# Patient Record
Sex: FEMALE | Race: WHITE | Employment: FULL TIME | URBAN - METROPOLITAN AREA
[De-identification: names, ages, dates, MRNs, and addresses within clinical notes are randomized per-mention and may not be internally consistent; named-entity substitution may affect disease eponyms.]

---

## 2017-08-09 ENCOUNTER — OP HISTORICAL/CONVERTED ENCOUNTER (OUTPATIENT)
Dept: OTHER | Age: 32
End: 2017-08-09

## 2018-02-15 ENCOUNTER — ED HISTORICAL/CONVERTED ENCOUNTER (OUTPATIENT)
Dept: OTHER | Age: 33
End: 2018-02-15

## 2018-02-21 ENCOUNTER — ED HISTORICAL/CONVERTED ENCOUNTER (OUTPATIENT)
Dept: OTHER | Age: 33
End: 2018-02-21

## 2018-02-28 ENCOUNTER — OP HISTORICAL/CONVERTED ENCOUNTER (OUTPATIENT)
Dept: OTHER | Age: 33
End: 2018-02-28

## 2019-03-04 ENCOUNTER — OFFICE VISIT (OUTPATIENT)
Dept: SURGERY | Age: 34
End: 2019-03-04

## 2019-03-04 DIAGNOSIS — Z98.84 S/P GASTRIC BYPASS: ICD-10-CM

## 2019-03-04 DIAGNOSIS — R10.9 ABDOMINAL PAIN, UNSPECIFIED ABDOMINAL LOCATION: Primary | ICD-10-CM

## 2019-03-04 DIAGNOSIS — R11.2 NAUSEA AND VOMITING, INTRACTABILITY OF VOMITING NOT SPECIFIED, UNSPECIFIED VOMITING TYPE: ICD-10-CM

## 2019-03-04 RX ORDER — OMEPRAZOLE 20 MG/1
20 CAPSULE, DELAYED RELEASE ORAL DAILY
COMMUNITY
End: 2019-03-08 | Stop reason: ALTCHOICE

## 2019-03-04 RX ORDER — ARIPIPRAZOLE 10 MG/1
10 TABLET ORAL DAILY
COMMUNITY

## 2019-03-04 RX ORDER — SERTRALINE HYDROCHLORIDE 100 MG/1
TABLET, FILM COATED ORAL DAILY
COMMUNITY

## 2019-03-04 RX ORDER — FLUTICASONE PROPIONATE AND SALMETEROL 250; 50 UG/1; UG/1
1 POWDER RESPIRATORY (INHALATION) EVERY 12 HOURS
COMMUNITY

## 2019-03-04 RX ORDER — HYDROXYZINE PAMOATE 25 MG/1
25 CAPSULE ORAL
COMMUNITY

## 2019-03-04 RX ORDER — BUSPIRONE HYDROCHLORIDE 10 MG/1
10 TABLET ORAL 3 TIMES DAILY
COMMUNITY

## 2019-03-04 RX ORDER — TRAZODONE HYDROCHLORIDE 100 MG/1
100 TABLET ORAL
COMMUNITY

## 2019-03-04 NOTE — PROGRESS NOTES
HISTORY OF PRESENT ILLNESS  Niki Clarke is a 34 y.o. female who is referred by Dr. Emiliano Bowman for further evaluation of abdominal pain. Ms. Ivett Fowler is s/p laparoscopic gastric bypass in 2017. She has lost approximately 154 pounds thus far. Her post operative course was complicated by what sounds like a perforated ulcer at the gastrojejunostomy which was repaired laparoscopically. (By Report. Records not available at this time.) She is still experiencing epigastric abdominal pain and is taking Prilosec. Denies NSAID use. Furthermore, Ms. Ivett Fowler is experiencing pain at her umbilicus and is concerned that she has a recurrent umbilical hernia. She is s/p umbilical hernia repair in the past which was apparently complicated by a post operative wound infection. (By report. Records not available.) Unsure if mesh was placed. Ms. Ivett Fowler also tells me that she has enlarged axillary and inguinal lymph nodes and is scheduled for CT Scan of the chest/abdomen/pelvis this week. She has otherwise been in her usual state of health. Past Medical History:  No date: Asthma  No date: Constipation  No date: Depression  No date: Gastric ulcer  No date: Hair loss  No date: Nausea & vomiting  No date: Poor appetite  3/4/2019: S/P gastric bypass  No date: Sleep trouble  No date: Stomach pain  No date: Tiredness  No date: Weakness    Past Surgical History:  No date: HX APPENDECTOMY  No date: HX  SECTION  No date: HX CHOLECYSTECTOMY  No date: HX COLONOSCOPY  No date: HX GASTRIC BYPASS  No date: HX HERNIA REPAIR  No date: HX HYSTERECTOMY  No date: HX TUBAL LIGATION    History reviewed. No pertinent family history. Social History: Employment - [employer]. Tobacco - Denies. EtOH - Denies. Review of systems negative except as noted. Review of Systems   Gastrointestinal: Positive for abdominal pain, constipation, nausea and vomiting. Neurological: Positive for weakness. Psychiatric/Behavioral: Positive for depression. The patient is nervous/anxious and has insomnia. Physical Exam   Constitutional: She appears well-developed and well-nourished. No distress. HENT:   Head: Normocephalic and atraumatic. Eyes: No scleral icterus. Neck: Neck supple. Cardiovascular: Normal rate and regular rhythm. Pulmonary/Chest: Effort normal and breath sounds normal.   Abdominal: Soft. She exhibits no distension. There is tenderness (Epigastric.). There is no rebound and no guarding. A hernia is present. Hernia confirmed positive in the ventral area (Possible small hernia at umbilicus. ). Musculoskeletal: Normal range of motion. Lymphadenopathy:     She has no cervical adenopathy. Neurological: She is alert. Skin:   Multiple well healed abdominal wall scars. Vitals reviewed. ASSESSMENT and PLAN  In view of the findings on H and P, concerned that Ms. Roque Sparrow has a recurrent anastomotic ulcer. Will ask GI to see for EGD. Continue Prilosec for now. Post gastric bypass diet as tolerated. Asked Ms. Barragan to bring CT Scans for review. Will try to obtain records from gastric bypass surgery, ulcer surgery and hernia repair. Will see Ms. Roque Sparrow following EGD to review findings with her. She is agreeable to this plan and is most certainly free to contact the office should any questions or concerns arise.      CC: Dr. Marlena Winston

## 2019-03-05 ENCOUNTER — DOCUMENTATION ONLY (OUTPATIENT)
Dept: SURGERY | Age: 34
End: 2019-03-05

## 2019-03-05 NOTE — PROGRESS NOTES
Reached out to [facility] in [place] to request medical records they do not take request via fax you have to mail signed release.   Released signed by patient scanned and mailed to   [facility]/ [facility]  ATTN: Release of Information  [id number] [address] [facility], [address] [place] [address], [address] [place] [address]

## 2019-03-06 VITALS
HEIGHT: 65 IN | BODY MASS INDEX: 22.82 KG/M2 | HEART RATE: 86 BPM | OXYGEN SATURATION: 99 % | SYSTOLIC BLOOD PRESSURE: 136 MMHG | RESPIRATION RATE: 16 BRPM | DIASTOLIC BLOOD PRESSURE: 69 MMHG | TEMPERATURE: 98.2 F | WEIGHT: 137 LBS

## 2019-03-07 ENCOUNTER — APPOINTMENT (OUTPATIENT)
Dept: CT IMAGING | Age: 34
End: 2019-03-07
Attending: EMERGENCY MEDICINE
Payer: OTHER GOVERNMENT

## 2019-03-07 ENCOUNTER — OFFICE VISIT (OUTPATIENT)
Dept: SURGERY | Age: 34
End: 2019-03-07

## 2019-03-07 ENCOUNTER — HOSPITAL ENCOUNTER (EMERGENCY)
Age: 34
Discharge: HOME | End: 2019-03-07
Attending: EMERGENCY MEDICINE
Payer: OTHER GOVERNMENT

## 2019-03-07 VITALS
RESPIRATION RATE: 18 BRPM | HEART RATE: 98 BPM | BODY MASS INDEX: 22.42 KG/M2 | WEIGHT: 134.6 LBS | DIASTOLIC BLOOD PRESSURE: 83 MMHG | HEIGHT: 65 IN | OXYGEN SATURATION: 100 % | SYSTOLIC BLOOD PRESSURE: 132 MMHG | TEMPERATURE: 98.1 F

## 2019-03-07 VITALS
OXYGEN SATURATION: 100 % | HEART RATE: 78 BPM | DIASTOLIC BLOOD PRESSURE: 73 MMHG | TEMPERATURE: 98.7 F | RESPIRATION RATE: 20 BRPM | SYSTOLIC BLOOD PRESSURE: 129 MMHG | WEIGHT: 138 LBS | HEIGHT: 64 IN | BODY MASS INDEX: 23.56 KG/M2

## 2019-03-07 DIAGNOSIS — K28.9 MARGINAL ULCER: Primary | ICD-10-CM

## 2019-03-07 DIAGNOSIS — R11.2 NON-INTRACTABLE VOMITING WITH NAUSEA, UNSPECIFIED VOMITING TYPE: ICD-10-CM

## 2019-03-07 DIAGNOSIS — Z87.11 H/O GASTRIC ULCER: ICD-10-CM

## 2019-03-07 DIAGNOSIS — R10.13 ABDOMINAL PAIN, EPIGASTRIC: Primary | ICD-10-CM

## 2019-03-07 DIAGNOSIS — Z98.84 H/O GASTRIC BYPASS: ICD-10-CM

## 2019-03-07 DIAGNOSIS — Z98.84 S/P GASTRIC BYPASS: ICD-10-CM

## 2019-03-07 DIAGNOSIS — R10.13 EPIGASTRIC ABDOMINAL PAIN: ICD-10-CM

## 2019-03-07 LAB
ALBUMIN SERPL-MCNC: 4.4 G/DL (ref 3.5–5)
ALBUMIN/GLOB SERPL: 1.3 {RATIO} (ref 1.1–2.2)
ALP SERPL-CCNC: 76 U/L (ref 45–117)
ALT SERPL-CCNC: 23 U/L (ref 12–78)
ANION GAP SERPL CALC-SCNC: 7 MMOL/L (ref 5–15)
APPEARANCE UR: ABNORMAL
AST SERPL-CCNC: 16 U/L (ref 15–37)
BACTERIA URNS QL MICRO: ABNORMAL /HPF
BASOPHILS # BLD: 0 K/UL (ref 0–0.1)
BASOPHILS NFR BLD: 0 % (ref 0–1)
BILIRUB SERPL-MCNC: 0.6 MG/DL (ref 0.2–1)
BILIRUB UR QL: NEGATIVE
BUN SERPL-MCNC: 13 MG/DL (ref 6–20)
BUN/CREAT SERPL: 18 (ref 12–20)
CALCIUM SERPL-MCNC: 9 MG/DL (ref 8.5–10.1)
CHLORIDE SERPL-SCNC: 107 MMOL/L (ref 97–108)
CO2 SERPL-SCNC: 27 MMOL/L (ref 21–32)
COLOR UR: ABNORMAL
COMMENT, HOLDF: NORMAL
CREAT SERPL-MCNC: 0.73 MG/DL (ref 0.55–1.02)
DIFFERENTIAL METHOD BLD: NORMAL
EOSINOPHIL # BLD: 0 K/UL (ref 0–0.4)
EOSINOPHIL NFR BLD: 1 % (ref 0–7)
EPITH CASTS URNS QL MICRO: ABNORMAL /LPF
ERYTHROCYTE [DISTWIDTH] IN BLOOD BY AUTOMATED COUNT: 13.1 % (ref 11.5–14.5)
GLOBULIN SER CALC-MCNC: 3.3 G/DL (ref 2–4)
GLUCOSE SERPL-MCNC: 128 MG/DL (ref 65–100)
GLUCOSE UR STRIP.AUTO-MCNC: NEGATIVE MG/DL
HCG UR QL: NEGATIVE
HCT VFR BLD AUTO: 40.3 % (ref 35–47)
HGB BLD-MCNC: 13 G/DL (ref 11.5–16)
HGB UR QL STRIP: NEGATIVE
IMM GRANULOCYTES # BLD AUTO: 0 K/UL (ref 0–0.04)
IMM GRANULOCYTES NFR BLD AUTO: 0 % (ref 0–0.5)
KETONES UR QL STRIP.AUTO: NEGATIVE MG/DL
LACTATE BLD-SCNC: 0.49 MMOL/L (ref 0.4–2)
LEUKOCYTE ESTERASE UR QL STRIP.AUTO: NEGATIVE
LIPASE SERPL-CCNC: 85 U/L (ref 73–393)
LYMPHOCYTES # BLD: 1.4 K/UL (ref 0.8–3.5)
LYMPHOCYTES NFR BLD: 26 % (ref 12–49)
MCH RBC QN AUTO: 31.4 PG (ref 26–34)
MCHC RBC AUTO-ENTMCNC: 32.3 G/DL (ref 30–36.5)
MCV RBC AUTO: 97.3 FL (ref 80–99)
MONOCYTES # BLD: 0.4 K/UL (ref 0–1)
MONOCYTES NFR BLD: 7 % (ref 5–13)
NEUTS SEG # BLD: 3.6 K/UL (ref 1.8–8)
NEUTS SEG NFR BLD: 66 % (ref 32–75)
NITRITE UR QL STRIP.AUTO: NEGATIVE
NRBC # BLD: 0 K/UL (ref 0–0.01)
NRBC BLD-RTO: 0 PER 100 WBC
PH UR STRIP: 7 [PH] (ref 5–8)
PLATELET # BLD AUTO: 270 K/UL (ref 150–400)
PMV BLD AUTO: 11.2 FL (ref 8.9–12.9)
POTASSIUM SERPL-SCNC: 3.6 MMOL/L (ref 3.5–5.1)
PROT SERPL-MCNC: 7.7 G/DL (ref 6.4–8.2)
PROT UR STRIP-MCNC: NEGATIVE MG/DL
RBC # BLD AUTO: 4.14 M/UL (ref 3.8–5.2)
RBC #/AREA URNS HPF: ABNORMAL /HPF (ref 0–5)
SAMPLES BEING HELD,HOLD: NORMAL
SODIUM SERPL-SCNC: 141 MMOL/L (ref 136–145)
SP GR UR REFRACTOMETRY: 1.03 (ref 1–1.03)
TROPONIN I SERPL-MCNC: <0.05 NG/ML
UROBILINOGEN UR QL STRIP.AUTO: 1 EU/DL (ref 0.2–1)
WBC # BLD AUTO: 5.5 K/UL (ref 3.6–11)
WBC URNS QL MICRO: ABNORMAL /HPF (ref 0–4)

## 2019-03-07 PROCEDURE — 85025 COMPLETE CBC W/AUTO DIFF WBC: CPT

## 2019-03-07 PROCEDURE — 99284 EMERGENCY DEPT VISIT MOD MDM: CPT

## 2019-03-07 PROCEDURE — 96374 THER/PROPH/DIAG INJ IV PUSH: CPT

## 2019-03-07 PROCEDURE — 84484 ASSAY OF TROPONIN QUANT: CPT

## 2019-03-07 PROCEDURE — 96361 HYDRATE IV INFUSION ADD-ON: CPT

## 2019-03-07 PROCEDURE — 74011250636 HC RX REV CODE- 250/636: Performed by: EMERGENCY MEDICINE

## 2019-03-07 PROCEDURE — 74011000258 HC RX REV CODE- 258: Performed by: RADIOLOGY

## 2019-03-07 PROCEDURE — 83690 ASSAY OF LIPASE: CPT

## 2019-03-07 PROCEDURE — 74177 CT ABD & PELVIS W/CONTRAST: CPT

## 2019-03-07 PROCEDURE — 80053 COMPREHEN METABOLIC PANEL: CPT

## 2019-03-07 PROCEDURE — 81025 URINE PREGNANCY TEST: CPT

## 2019-03-07 PROCEDURE — 36415 COLL VENOUS BLD VENIPUNCTURE: CPT

## 2019-03-07 PROCEDURE — 96375 TX/PRO/DX INJ NEW DRUG ADDON: CPT

## 2019-03-07 PROCEDURE — 81001 URINALYSIS AUTO W/SCOPE: CPT

## 2019-03-07 PROCEDURE — 74011636320 HC RX REV CODE- 636/320: Performed by: RADIOLOGY

## 2019-03-07 PROCEDURE — 83605 ASSAY OF LACTIC ACID: CPT

## 2019-03-07 RX ORDER — SODIUM CHLORIDE 0.9 % (FLUSH) 0.9 %
10 SYRINGE (ML) INJECTION
Status: COMPLETED | OUTPATIENT
Start: 2019-03-07 | End: 2019-03-07

## 2019-03-07 RX ORDER — KETOROLAC TROMETHAMINE 30 MG/ML
15 INJECTION, SOLUTION INTRAMUSCULAR; INTRAVENOUS
Status: COMPLETED | OUTPATIENT
Start: 2019-03-07 | End: 2019-03-07

## 2019-03-07 RX ORDER — ONDANSETRON 4 MG/1
4 TABLET, ORALLY DISINTEGRATING ORAL
Qty: 10 TAB | Refills: 0 | Status: SHIPPED | OUTPATIENT
Start: 2019-03-07

## 2019-03-07 RX ORDER — SUCRALFATE 1 G/10ML
1 SUSPENSION ORAL 4 TIMES DAILY
Qty: 414 ML | Refills: 0 | Status: SHIPPED | OUTPATIENT
Start: 2019-03-07

## 2019-03-07 RX ORDER — PANTOPRAZOLE SODIUM 40 MG/1
40 TABLET, DELAYED RELEASE ORAL 2 TIMES DAILY
Qty: 60 TAB | Refills: 0 | Status: SHIPPED | OUTPATIENT
Start: 2019-03-07

## 2019-03-07 RX ORDER — ONDANSETRON 2 MG/ML
4 INJECTION INTRAMUSCULAR; INTRAVENOUS
Status: COMPLETED | OUTPATIENT
Start: 2019-03-07 | End: 2019-03-07

## 2019-03-07 RX ADMIN — KETOROLAC TROMETHAMINE 15 MG: 30 INJECTION INTRAMUSCULAR; INTRAVENOUS at 20:33

## 2019-03-07 RX ADMIN — Medication 10 ML: at 22:07

## 2019-03-07 RX ADMIN — ONDANSETRON 4 MG: 2 INJECTION INTRAMUSCULAR; INTRAVENOUS at 20:33

## 2019-03-07 RX ADMIN — SODIUM CHLORIDE 1000 ML: 900 INJECTION, SOLUTION INTRAVENOUS at 20:32

## 2019-03-07 RX ADMIN — IOHEXOL 50 ML: 240 INJECTION, SOLUTION INTRATHECAL; INTRAVASCULAR; INTRAVENOUS; ORAL at 22:07

## 2019-03-07 RX ADMIN — IOPAMIDOL 100 ML: 755 INJECTION, SOLUTION INTRAVENOUS at 22:07

## 2019-03-07 RX ADMIN — SODIUM CHLORIDE 100 ML: 900 INJECTION, SOLUTION INTRAVENOUS at 22:07

## 2019-03-07 NOTE — PROGRESS NOTES
1. Have you been to the ER, urgent care clinic since your last visit? Hospitalized since your last visit? Yes When: ED Dunia batres groin pain. 2/19     2. Have you seen or consulted any other health care providers outside of the [address] [place] [address] since your last visit? Include any pap smears or colon screening.  Yes When: [facility].

## 2019-03-07 NOTE — PATIENT INSTRUCTIONS
Peptic Ulcer Disease: Care Instructions  Your Care Instructions    Peptic ulcers are sores on the inside of the stomach or the small intestine (such as a duodenal ulcer). They are most often caused by an infection with bacteria or from use of nonsteroidal anti-inflammatory drugs (NSAIDs). NSAIDs include aspirin, ibuprofen (Advil), and naproxen (Aleve). Your doctor may have prescribed medicine to reduce stomach acid. You also may need to take antibiotics if your peptic ulcers are caused by an infection. You can help yourself heal and keep ulcers from coming back by making some changes in your lifestyle. Quit smoking. Limit alcohol. Follow-up care is a key part of your treatment and safety. Be sure to make and go to all appointments, and call your doctor if you are having problems. It's also a good idea to know your test results and keep a list of the medicines you take. How can you care for yourself at home? · Be safe with medicines. Take your medicines exactly as prescribed. Call your doctor if you think you are having a problem with your medicine. · Do not take aspirin or other NSAIDs such as ibuprofen (Advil or Motrin) or naproxen (Aleve). Ask your doctor what you can take for pain. · Do not smoke. Smoking can make ulcers worse. If you need help quitting, talk to your doctor about stop-smoking programs and medicines. These can increase your chances of quitting for good. · Drink in moderation or avoid drinking alcohol. · Eat a balanced diet of small, frequent meals. See a dietitian if you need help planning your meals. When should you call for help? [id number] anytime you think you may need emergency care.  For example, call if:    · You vomit blood or what looks like coffee grounds.     · You pass maroon or very bloody stools.    Call your doctor now or seek immediate medical care if:    · You have new or worse belly pain.     · Your stools are black and look like tar, or they have streaks of blood.     · You vomit.    Watch closely for changes in your health, and be sure to contact your doctor if:    · You do not get better as expected. Where can you learn more? Go to http://zoe-ilan.info/. Enter V605 in the search box to learn more about \"Peptic Ulcer Disease: Care Instructions. \"  Current as of: March 27, 2018  Content Version: 11.9  © 2006-2018 [employer]. Care instructions adapted under license by [employer] (which disclaims liability or warranty for this information). If you have questions about a medical condition or this instruction, always ask your healthcare professional. [place] [address] any warranty or liability for your use of this information. Upper GI Endoscopy: Before Your Procedure  What is an upper GI endoscopy? An upper gastrointestinal (or GI) endoscopy is a test that allows your doctor to look at the inside of your esophagus, stomach, and the first part of your small intestine, called the duodenum. The esophagus is the tube that carries food to your stomach. The doctor uses a thin, lighted tube that bends. It is called an endoscope, or scope. The doctor puts the tip of the scope in your mouth and gently moves it down your throat. The scope is a flexible video camera. The doctor looks at a monitor (like a TV set or a computer screen) as he or she moves the scope. A doctor may do this test, which is also called a procedure, to look for ulcers, tumors, infection, or bleeding. It also can be used to look for signs of acid backing up into your esophagus. This is called gastroesophageal reflux disease, or GERD. The doctor can use the scope to take a sample of tissue for study (a biopsy). The doctor also can use the scope to take out growths or stop bleeding. Follow-up care is a key part of your treatment and safety. Be sure to make and go to all appointments, and call your doctor if you are having problems.  It's also a good idea to know your test results and keep a list of the medicines you take. What happens before the procedure?   Preparing for the procedure    · Understand exactly what procedure is planned, along with the risks, benefits, and other options. · Tell your doctors ALL the medicines, vitamins, supplements, and herbal remedies you take. Some of these can increase the risk of bleeding or interact with anesthesia.     · If you take blood thinners, such as warfarin (Coumadin), clopidogrel (Plavix), or aspirin, be sure to talk to your doctor. He or she will tell you if you should stop taking these medicines before your procedure. Make sure that you understand exactly what your doctor wants you to do.     · Your doctor will tell you which medicines to take or stop before your procedure. You may need to stop taking certain medicines a week or more before the procedure. So talk to your doctor as soon as you can.     · If you have an advance directive, let your doctor know. It may include a living will and a durable power of  for health care. Bring a copy to the hospital. If you don't have one, you may want to prepare one. It lets your doctor and loved ones know your health care wishes. Doctors advise that everyone prepare these papers before any type of surgery or procedure. Procedures can be stressful. This information will help you understand what you can expect. And it will help you safely prepare for your procedure. What happens on the day of the procedure? · Follow the instructions exactly about when to stop eating and drinking. If you don't, your procedure may be canceled. If your doctor told you to take your medicines on the day of the procedure, take them with only a sip of water.     · Take a bath or shower before you come in for your procedure. Do not apply lotions, perfumes, deodorants, or nail polish.     · Take off all jewelry and piercings.  And take out contact lenses, if you wear them.    At the hospital or surgery center   · Bring a picture ID.     · The test may take 15 to 30 minutes.     · The doctor may spray medicine on the back of your throat to numb it. You also will get medicine to prevent pain and to relax you.     · You will lie on your left side. The doctor will put the scope in your mouth and toward the back of your throat. The doctor will tell you when to swallow. This helps the scope move down your throat. You will be able to breathe normally. The doctor will move the scope down your esophagus into your stomach. The doctor also may look at the duodenum.     · If your doctor wants to take a sample of tissue for a biopsy, he or she may use small surgical tools, which are put into the scope, to cut off some tissue. You will not feel a biopsy, if one is taken. The doctor also can use the tools to stop bleeding or to do other treatments, if needed.     · You will stay at the hospital or surgery center for 1 to 2 hours until the medicine you were given wears off. Going home   · Be sure you have someone to drive you home. Anesthesia and pain medicine make it unsafe for you to drive.     · You will be given more specific instructions about recovering from your procedure. They will cover things like diet, wound care, follow-up care, driving, and getting back to your normal routine. When should you call your doctor? · You have questions or concerns.     · You don't understand how to prepare for your procedure.     · You become ill before the procedure (such as fever, flu, or a cold).     · You need to reschedule or have changed your mind about having the procedure. Where can you learn more? Go to http://zoe-ilan.info/. Enter P790 in the search box to learn more about \"Upper GI Endoscopy: Before Your Procedure. \"  Current as of: March 27, 2018  Content Version: 11.9  © 2175-3054 [employer].  Care instructions adapted under license by [employer] (which disclaims liability or warranty for this information). If you have questions about a medical condition or this instruction, always ask your healthcare professional. [address] any warranty or liability for your use of this information.

## 2019-03-07 NOTE — Clinical Note
Todd Carvajal, this is patient I referenced earlier today-I will have Flower Peres contact you. We really cannot continue to care for her in a productive way (FYI we did not perform any of her surgeries). Thanks!

## 2019-03-08 ENCOUNTER — OFFICE VISIT (OUTPATIENT)
Dept: GYNECOLOGY | Age: 34
End: 2019-03-08

## 2019-03-08 VITALS
DIASTOLIC BLOOD PRESSURE: 80 MMHG | BODY MASS INDEX: 24.01 KG/M2 | WEIGHT: 140.6 LBS | RESPIRATION RATE: 16 BRPM | SYSTOLIC BLOOD PRESSURE: 147 MMHG | HEART RATE: 79 BPM | TEMPERATURE: 98.4 F | HEIGHT: 64 IN

## 2019-03-08 DIAGNOSIS — R59.0 INGUINAL LYMPHADENOPATHY: Primary | ICD-10-CM

## 2019-03-08 PROBLEM — Z87.19 H/O HERNIA REPAIR: Status: ACTIVE | Noted: 2019-03-08

## 2019-03-08 PROBLEM — F41.9 ANXIETY AND DEPRESSION: Status: ACTIVE | Noted: 2019-03-08

## 2019-03-08 PROBLEM — Z98.890 H/O HERNIA REPAIR: Status: ACTIVE | Noted: 2019-03-08

## 2019-03-08 PROBLEM — Z90.49 S/P CHOLECYSTECTOMY: Status: ACTIVE | Noted: 2019-03-08

## 2019-03-08 PROBLEM — D68.51 FACTOR V LEIDEN (HCC): Status: ACTIVE | Noted: 2019-03-08

## 2019-03-08 PROBLEM — F32.A ANXIETY AND DEPRESSION: Status: ACTIVE | Noted: 2019-03-08

## 2019-03-08 PROBLEM — Z84.81 FHX: BRCA2 GENE POSITIVE: Status: ACTIVE | Noted: 2019-03-08

## 2019-03-08 PROBLEM — Z90.710 S/P TAH (TOTAL ABDOMINAL HYSTERECTOMY): Status: ACTIVE | Noted: 2019-03-08

## 2019-03-08 PROBLEM — J45.909 MODERATE ASTHMA: Status: ACTIVE | Noted: 2019-03-08

## 2019-03-08 NOTE — ED PROVIDER NOTES
34 y.o. female with past medical history significant for s/p Jaspreet-en-Y gastric bypass, asthma, depression, h/o perforated ulcer, s/p hysterectomy, s/p cholecystectomy, s/p appendectomy, s/p , s/p hernia repair x 2, who presents ambulatory to the ED. with chief complaint of vomiting and abdominal pain. Pt reports that she had a gastric bypass performed in  in [place]. She states that she has had no complication, but notes that she has been \"regressing\" over the last 7 days. She complains of abdominal pain, chest pain and inability to eat or drink anything over the past week. Notes that her pain is worsening, and rates her current pain as 9/10 in intensity. Pt also complains of \"foamy, dark coffee ground\" emesis that occurs with any PO intake. She states that she has vomited x 4 today. Pt also complains of a fever (Tmax: \"101\" ~1430 today), chills and constipation x 9 days. Denies taking any medication for her fever PTA. Pt reports that she had been taking Omeprazole since surgery until being switched to Protonix ~30 days ago. Notes that Protonix does not provide any significant relief. Pt reports that she saw Dr. Marium Salazar earlier today who noted that patient may have another hernia, but she states that it is not causing her pain. She reports that she has an EGD scheduled for Wednesday 3/13/19. Notes additional h/o perforated ulcer. There are no other acute medical concerns at this time. Social hx: Negative for Tobacco use; Negative for EtOH use; Negative for Illicit Drug use  PCP: No primary care provider on file. Gastroenterologist: Amanda Mercer MD    Note written by Sofía Mcgraw, as dictated by Natali Rain MD 8:10 PM       The history is provided by the patient and medical records. No  was used.         Past Medical History:   Diagnosis Date    Asthma     Constipation     Depression     Gastric ulcer     Hair loss     Nausea & vomiting     Poor appetite     S/P gastric bypass 3/4/2019    Sleep trouble     Stomach pain     Tiredness     Weakness        Past Surgical History:   Procedure Laterality Date    HX APPENDECTOMY      HX  SECTION      HX CHOLECYSTECTOMY  2018    HX COLONOSCOPY      HX GASTRIC BYPASS  2017    HX HERNIA REPAIR      HX HYSTERECTOMY      HX TUBAL LIGATION           Family History:   Problem Relation Age of Onset    Cancer Mother     Heart Disease Mother     Other Mother         Factor 5    Diabetes Father     Heart Disease Father     Hypertension Father     Stroke Father     Cancer Sister     Diabetes Brother     Hypertension Brother     Cancer Maternal Aunt     Cancer Maternal Uncle         stomach    Psychiatric Disorder Maternal Grandmother     Cancer Maternal Grandmother     Hypertension Paternal Grandmother     Stroke Paternal Grandmother     Hypertension Paternal Grandfather     Stroke Paternal Grandfather        Social History     Socioeconomic History    Marital status: UNKNOWN     Spouse name: Not on file    Number of children: Not on file    Years of education: Not on file    Highest education level: Not on file   Social Needs    Financial resource strain: Not on file    Food insecurity - worry: Not on file    Food insecurity - inability: Not on file   Tape TV needs - medical: Not on file   Tape TV needs - non-medical: Not on file   Occupational History    Not on file   Tobacco Use    Smoking status: Never Smoker    Smokeless tobacco: Never Used   Substance and Sexual Activity    Alcohol use: No     Frequency: Never    Drug use: No    Sexual activity: Yes     Birth control/protection: Surgical   Other Topics Concern    Not on file   Social History Narrative    Not on file         ALLERGIES: Coconut and Dm [dextromethorphan-guaifenesin]    Review of Systems   Constitutional: Positive for appetite change, chills and fever.    Cardiovascular: Positive for chest pain. Gastrointestinal: Positive for abdominal pain, constipation and vomiting. All other systems reviewed and are negative. Vitals:    03/07/19 1955 03/07/19 2014   BP:  108/79   Pulse: (!) 120 78   Resp:  16   Temp:  98.9 °F (37.2 °C)   SpO2: 99% 100%   Weight:  62.6 kg (138 lb)   Height:  5' 4\" (1.626 m)            Physical Exam   Constitutional: She is oriented to person, place, and time. She appears well-developed and well-nourished. NAD, AxOx4, speaking in complete sentences  Multiple piercings/ tattoo's noted     HENT:   Head: Normocephalic and atraumatic. Mouth/Throat: Oropharynx is clear and moist.   Cn intact     Eyes: Conjunctivae and EOM are normal. Pupils are equal, round, and reactive to light. Right eye exhibits no discharge. Left eye exhibits no discharge. No scleral icterus. Neck: Normal range of motion. Neck supple. No JVD present. No tracheal deviation present. Cardiovascular: Normal rate, regular rhythm, normal heart sounds and intact distal pulses. Exam reveals no gallop and no friction rub. No murmur heard. Pulmonary/Chest: Effort normal and breath sounds normal. No stridor. No respiratory distress. She has no wheezes. She has no rales. She exhibits no tenderness. Abdominal: Soft. Bowel sounds are normal. She exhibits no distension and no mass. There is tenderness. There is no rebound and no guarding. A hernia is present. Min ttp    Neg peritoneal signs    (+) bowell sounds   Genitourinary: No vaginal discharge found. Genitourinary Comments: Pt denies urinary/ vaginal complaints   Musculoskeletal: Normal range of motion. She exhibits no edema, tenderness or deformity. Neurological: She is alert and oriented to person, place, and time. She displays normal reflexes. No cranial nerve deficit or sensory deficit. She exhibits normal muscle tone. Coordination normal.   Skin: Skin is warm and dry. Capillary refill takes less than 2 seconds. No rash noted. No erythema. No pallor. Psychiatric: She has a normal mood and affect. Her behavior is normal. Thought content normal.   Nursing note and vitals reviewed. MDM       Procedures    CONSULT NOTE:  8:54 PM Jean Paul Harris MD spoke with Dr. Jorge Frederick, Consult for Surgery. Discussed available diagnostic tests and clinical findings. Dr. Ger Chaves agrees with treatment plan. He has no further recommendations. 9:21 PM  Pt drinking po contrast; awaiting ct;      10:51 PM  Eijorgito Burgos  results have been reviewed with her. She has been counseled regarding her diagnosis. She verbally conveys understanding and agreement of the signs, symptoms, diagnosis, treatment and prognosis and additionally agrees to Call/ Arrange follow up as recommended with  in 24 - 48 hours. She also agrees with the care-plan and conveys that all of her questions have been answered. I have also put together some discharge instructions for her that include: 1) educational information regarding their diagnosis, 2) how to care for their diagnosis at home, as well a 3) list of reasons why they would want to return to the ED prior to their follow-up appointment, should their condition change or for concerns.

## 2019-03-08 NOTE — ED TRIAGE NOTES
Pt presents with vomiting and abdominal pain. Pt had gastric bypass last June. Pt reports 8 days ago she began having pain and has been unable to eat/drink.  Pt reports throwing up today with coffee grounds

## 2019-03-08 NOTE — LETTER
3/8/2019 3:33 PM 
 
Patient:  Simón Pelayo YOB: 1985 Date of Visit: 3/8/2019 Dear Dr. Antoni Flowers VIA Facsimile: [phone] 
 : Thank you for referring Ms. Simón Pelayo to me for evaluation/treatment. Below are the relevant portions of my assessment and plan of care. New patient referred by Anna Jorgensen for hx of endometrial cancer, gastric bypass, umbilical hernia repair, and reports enlarged inguinal and axillary lymph nodes. Patient had a ct scan on 3/7/19. [address], [address] [place], [address] 
P [phone]  F [phone] Office Note Patient ID: 
Name:  Simón Pelayo MRN:  [id number] :  1985/ y.o. Date:  3/8/2019 HISTORY OF PRESENT ILLNESS: 
Simón Pelayo is a 34 y.o.  premenopausal female who is being seen for inguinal lymphadenopathy, as well as a family history of breast and uterine cancer. She is referred by Dr. Antoni Flowers at [address] on [facility]. Grover Hammans. She is s/p laparoscopic hysterectomy in May 2018 for menorrhagia, dysmenorrhea, chronic pelvic pain, and dyspareunia with Dr. Cris Talavera. She apparently had repair of an umbilical hernia at the same time. In  she underwent a gastric bypass surgery in [place] and has lost over a hundred pounds. She has noticed some tender and enlarged lymph nodes in her groins since her hysterectomy. They have increased slightly in size. An ultrasound was done at [facility]. Grover Hammans, but it really only demonstrated enlarged nodes. I have been asked to see her for further evaluation and management. She has a family history of breast cancer. Both her mother and her grandmother had breast caner. Her mother also had uterine cancer. Her mother was tested and found to be BRCA 2 positive. Regulo Gallego has not been tested yet.   She still has both of her ovaries, as they were left behind at the time of her hysterectomy, which is appropriate considering her age. She also reports other family members with other various forms of cancer. She was actually seen yesterday in the ER at [facility] complaining of abdominal pain, chest pain, and inability to eat or drink much over the last week. The evaluation was essentially negative. She did have a CT scan. Her ovaries looked normal on the scan. The inguinal lymph nodes were visible on the scan, but no comment was made. She was discharged to follow up with Dr. Marisel Phelps, who has been seeing her since she moved to the area. She states she is supposed to have an EGD soon, as well as a colonoscopy. ROS: 
 and GI review:  Negative Cardiopulmonary review:  Negative Musculoskeletal:  Negative A comprehensive review of systems was negative except for that written in the History of Present Illness. , 10 point ROS 
 
 
OB/GYN ROS: 
Hx of  section x 1 Hx of robotic TLH Patient denies any abnormal bleeding or vaginal discharge. Problem List: 
Patient Active Problem List  
 Diagnosis Date Noted  Inguinal lymphadenopathy 2019  Anxiety and depression 2019  
 FHx: BRCA2 gene positive 2019  Factor V Leiden ([facility] [address] 2019  Moderate asthma 2019  S/P cholecystectomy 2019  S/P RADHA (total abdominal hysterectomy) 2019  H/O hernia repair 2019  S/P gastric bypass 2019 PMH: 
Past Medical History:  
Diagnosis Date  Asthma  Constipation  Depression  Gastric ulcer  Hair loss  Nausea & vomiting  Poor appetite  S/P gastric bypass 3/4/2019  Sleep trouble  Stomach pain  Tiredness  Weakness PSH: 
Past Surgical History:  
Procedure Laterality Date  HX APPENDECTOMY  HX  SECTION    
 HX CHOLECYSTECTOMY  2018  HX COLONOSCOPY    
 HX GASTRIC BYPASS  2017  HX HERNIA REPAIR    
 HX HYSTERECTOMY  HX TUBAL LIGATION Social History: 
Social History Tobacco Use  Smoking status: Never Smoker  Smokeless tobacco: Never Used Substance Use Topics  Alcohol use: No  
  Frequency: Never Family History: 
Family History Problem Relation Age of Onset  Cancer Mother  Heart Disease Mother Couch Other Mother Factor 5  
 Diabetes Father  Heart Disease Father  Hypertension Father  Stroke Father  Cancer Sister  Diabetes Brother  Hypertension Brother  Cancer Maternal Aunt  Cancer Maternal Uncle   
     stomach  Psychiatric Disorder Maternal Grandmother  Cancer Maternal Grandmother  Hypertension Paternal Grandmother  Stroke Paternal Grandmother  Hypertension Paternal Grandfather  Stroke Paternal Grandfather Medications: (reviewed) Current Outpatient Medications Medication Sig  pantoprazole (PROTONIX) 40 mg tablet Take 1 Tab by mouth two (2) times a day.  sucralfate (CARAFATE) 100 mg/mL suspension Take 10 mL by mouth four (4) times daily.  ondansetron (ZOFRAN ODT) 4 mg disintegrating tablet Take 1 Tab by mouth every eight (8) hours as needed for Nausea.  fluticasone-salmeterol (ADVAIR DISKUS) 250-50 mcg/dose diskus inhaler Take 1 Puff by inhalation every twelve (12) hours.  sertraline (ZOLOFT) 100 mg tablet Take  by mouth daily.  busPIRone (BUSPAR) 10 mg tablet Take 10 mg by mouth three (3) times daily.  hydrOXYzine pamoate (VISTARIL) 25 mg capsule Take 25 mg by mouth three (3) times daily as needed for Itching.  ARIPiprazole (ABILIFY) 10 mg tablet Take 10 mg by mouth daily.  traZODone (DESYREL) 100 mg tablet Take 100 mg by mouth nightly. No current facility-administered medications for this visit. Allergies: (reviewed) Allergies Allergen Reactions  Coconut Other (comments)  Dm [Dextromethorphan-Guaifenesin] Other (comments) OBJECTIVE: 
 
Physical Exam: VITAL SIGNS: Vitals:  
 03/08/19 1403 BP: 147/80 Pulse: 79 Resp: 16 Temp: 98.4 °F (36.9 °C) Weight: 140 lb 9.6 oz (63.8 kg) Height: 5' 4\" (1.626 m) Body mass index is 24.13 kg/m². GENERAL YOLY: Conversant, alert, oriented. No acute distress. HEENT: HEENT. No thyroid enlargement. No JVD. Neck: Supple without restrictions. RESPIRATORY: Clear to auscultation and percussion to the bases. No CVAT. CARDIOVASC: RRR without murmur/rub. GASTROINT: soft, non-tender, without masses or organomegaly MUSCULOSKEL: no joint tenderness, deformity or swelling EXTREMITIES: extremities normal, atraumatic, no cyanosis or edema PELVIC: Normal external genitalia. Normal vagina. Absent cervix and uterus. Adnexa not palpable. RECTAL: Deferred ZAK SURVEY: Mildly enlarged and tender lymph nodes in bilateral groins. NEURO: Grossly intact. No acute deficit. Lab Data: 
 
Lab Results Component Value Date/Time WBC 5.5 03/07/2019 08:26 PM  
 HGB 13.0 03/07/2019 08:26 PM  
 HCT 40.3 03/07/2019 08:26 PM  
 PLATELET 519 10/16/4371 08:26 PM  
 MCV 97.3 03/07/2019 08:26 PM  
 
Lab Results Component Value Date/Time Sodium 141 03/07/2019 08:26 PM  
 Potassium 3.6 03/07/2019 08:26 PM  
 Chloride 107 03/07/2019 08:26 PM  
 CO2 27 03/07/2019 08:26 PM  
 Anion gap 7 03/07/2019 08:26 PM  
 Glucose 128 (H) 03/07/2019 08:26 PM  
 BUN 13 03/07/2019 08:26 PM  
 Creatinine 0.73 03/07/2019 08:26 PM  
 BUN/Creatinine ratio 18 03/07/2019 08:26 PM  
 GFR est AA >60 03/07/2019 08:26 PM  
 GFR est non-AA >60 03/07/2019 08:26 PM  
 Calcium 9.0 03/07/2019 08:26 PM  
 
 
 
CT of abdomen/pelvis (3/7/19) LUNG BASES: 3 mm peripheral nodule in the left lower lobe image 4. 3 mm mm 
pleural-based nodule Left lower lobe image 11 3 mm right lower lobe nodule image 3 INCIDENTALLY IMAGED HEART AND MEDIASTINUM: Unremarkable. LIVER: No mass or biliary dilatation. Mild periportal edema, a nonspecific 
finding. GALLBLADDER: Surgically absent. SPLEEN: No mass.  
PANCREAS: No mass or ductal dilatation. ADRENALS: Unremarkable. KIDNEYS: No mass, calculus, or hydronephrosis. STOMACH: Status post gastric bypass surgery, with contrast filling the gastric 
pouch and proximal small bowel and no evidence for contrast in the excluded 
gastric remnant. No extravasation of contrast. No fluid collection or abscess 
suggested. SMALL BOWEL: No dilatation or wall thickening. COLON: No dilatation or wall thickening. APPENDIX: Not seen. PERITONEUM: No ascites or pneumoperitoneum. RETROPERITONEUM: No lymphadenopathy or aortic aneurysm. REPRODUCTIVE ORGANS: Not seen. URINARY BLADDER: No mass or calculus. BONES: No destructive bone lesion. ADDITIONAL COMMENTS: N/A 
  
IMPRESSION: 
1. Status post gastric bypass surgery with no leak, abscess, obstruction, 
gastric gastric fistula or other complicating feature demonstrated. 2. Tiny pulmonary nodules in the lower lobes of uncertain if any significance. Guidelines by the Fleischner society (Radiology 2005: 385:598-375) suggest that 
patients with a low risk for lung cancer who have nodules less than or equal to 
4 mm in diameter require no follow-up. In patients with a  higher risk, such as 
smokers, follow-up is recommended in one year. Patients with a known malignancy 
are at increased risk for metastasis and should receive three month follow-up. 
  
 
 
IMPRESSION/PLAN: 
Isabella Weaver is a 34 y.o. female with a working diagnosis of bilateral inguinal lymphadenopathy, along with a family history of breast cancer and BRCA 2 mutation. I reviewed with Isabella Weaver her medical records, physical exam, and review of symptoms. There are two issues that we are dealing with, but with possible overlap. As for her family history, Franklin Reilly needs genetic testing. She meets all of the requirements for testing considering her history. I counseled her on the importance of testing, the benefits of testing, and the potential risks of testing.   If she is found to be positive, we can then address risk-reducing surgery and/or enhanced screening for both breast and ovarian cancer. If negative, she can be somewhat reassured that she is not at an increased risk for either breast or ovarian cancer above that of the general population. Testing was performed and submitted during her visit today. As for the enlarged and tender nodes, the only way to know for sure as to the etiology is to perform a biopsy. We could perform a needle biopsy percutaneously or we could proceed with an excisional biopsy. I recommended the later. This would give us more tissue for pathology and a better chance of definitive diagnosis. The differential diagnosis includes both benign and malignant etiologies. I suspect it is from a benign issue, but I have seen a BRCA mutated patient with ovarian cancer present with groin adenopathy before. She was counseled on the risks, benefits, indications, and alternatives of surgery. Her questions were answered and she wishes to proceed. Signed By: Ariel Delcid MD   
 3/8/2019/1:08 PM  
 
 
 
If you have questions, please do not hesitate to call me. I look forward to following Ms. Anna Alford along with you.  
 
 
 
Sincerely, 
 
 
Ariel Delcid MD

## 2019-03-08 NOTE — PROGRESS NOTES
[address] [place] [address] [address], [address] [facility]  P [phone]  F [phone]    Office Note  Patient ID:  Name:  Roxie Colon  MRN:  [id number]  :  1985/33 y.o. Date:  3/8/2019      HISTORY OF PRESENT ILLNESS:  Roxie Colon is a 34 y.o.  premenopausal female who is being seen for inguinal lymphadenopathy, as well as a family history of breast and uterine cancer. She is referred by Dr. Ismael Haji at [address] on [facility]. Rocio Jackson. She is s/p laparoscopic hysterectomy in May 2018 for menorrhagia, dysmenorrhea, chronic pelvic pain, and dyspareunia with Dr. Ramo Hardy. She apparently had repair of an umbilical hernia at the same time. In  she underwent a gastric bypass surgery in [place] and has lost over a hundred pounds. She has noticed some tender and enlarged lymph nodes in her groins since her hysterectomy. They have increased slightly in size. An ultrasound was done at [facility]. Rocio Jackson, but it really only demonstrated enlarged nodes. I have been asked to see her for further evaluation and management. She has a family history of breast cancer. Both her mother and her grandmother had breast caner. Her mother also had uterine cancer. Her mother was tested and found to be BRCA 2 positive. Gabriela Castro has not been tested yet. She still has both of her ovaries, as they were left behind at the time of her hysterectomy, which is appropriate considering her age. She also reports other family members with other various forms of cancer. She was actually seen yesterday in the ER at [facility] complaining of abdominal pain, chest pain, and inability to eat or drink much over the last week. The evaluation was essentially negative. She did have a CT scan. Her ovaries looked normal on the scan. The inguinal lymph nodes were visible on the scan, but no comment was made.   She was discharged to follow up with Dr. Kendell Grajeda, who has been seeing her since she moved to the area. She states she is supposed to have an EGD soon, as well as a colonoscopy. ROS:   and GI review:  Negative  Cardiopulmonary review:  Negative   Musculoskeletal:  Negative    A comprehensive review of systems was negative except for that written in the History of Present Illness. , 10 point ROS      OB/GYN ROS:  Hx of  section x 1  Hx of robotic TLH  Patient denies any abnormal bleeding or vaginal discharge.        Problem List:  Patient Active Problem List    Diagnosis Date Noted    Inguinal lymphadenopathy 2019    Anxiety and depression 2019    FHx: BRCA2 gene positive 2019    Factor V Leiden ([facility] [address] 2019    Moderate asthma 2019    S/P cholecystectomy 2019    S/P RADHA (total abdominal hysterectomy) 2019    H/O hernia repair 2019    S/P gastric bypass 2019     PMH:  Past Medical History:   Diagnosis Date    Asthma     Constipation     Depression     Gastric ulcer     Hair loss     Nausea & vomiting     Poor appetite     S/P gastric bypass 3/4/2019    Sleep trouble     Stomach pain     Tiredness     Weakness       PSH:  Past Surgical History:   Procedure Laterality Date    HX APPENDECTOMY      HX  SECTION      HX CHOLECYSTECTOMY  2018    HX COLONOSCOPY      HX GASTRIC BYPASS  2017    HX HERNIA REPAIR      HX HYSTERECTOMY      HX TUBAL LIGATION        Social History:  Social History     Tobacco Use    Smoking status: Never Smoker    Smokeless tobacco: Never Used   Substance Use Topics    Alcohol use: No     Frequency: Never      Family History:  Family History   Problem Relation Age of Onset   [facility] Cancer Mother     Heart Disease Mother     Other Mother         Factor 11    Diabetes Father     Heart Disease Father     Hypertension Father     Stroke Father     Cancer Sister     Diabetes Brother     Hypertension Brother     Cancer Maternal Aunt     Cancer Maternal Uncle         stomach    Psychiatric Disorder Maternal Grandmother     Cancer Maternal Grandmother     Hypertension Paternal Grandmother     Stroke Paternal Grandmother     Hypertension Paternal Grandfather     Stroke Paternal Grandfather       Medications: (reviewed)  Current Outpatient Medications   Medication Sig    pantoprazole (PROTONIX) 40 mg tablet Take 1 Tab by mouth two (2) times a day.  sucralfate (CARAFATE) 100 mg/mL suspension Take 10 mL by mouth four (4) times daily.  ondansetron (ZOFRAN ODT) 4 mg disintegrating tablet Take 1 Tab by mouth every eight (8) hours as needed for Nausea.  fluticasone-salmeterol (ADVAIR DISKUS) 250-50 mcg/dose diskus inhaler Take 1 Puff by inhalation every twelve (12) hours.  sertraline (ZOLOFT) 100 mg tablet Take  by mouth daily.  busPIRone (BUSPAR) 10 mg tablet Take 10 mg by mouth three (3) times daily.  hydrOXYzine pamoate (VISTARIL) 25 mg capsule Take 25 mg by mouth three (3) times daily as needed for Itching.  ARIPiprazole (ABILIFY) 10 mg tablet Take 10 mg by mouth daily.  traZODone (DESYREL) 100 mg tablet Take 100 mg by mouth nightly. No current facility-administered medications for this visit. Allergies: (reviewed)  Allergies   Allergen Reactions    Coconut Other (comments)    Dm [Dextromethorphan-Guaifenesin] Other (comments)          OBJECTIVE:    Physical Exam:  VITAL SIGNS: Vitals:    03/08/19 1403   BP: 147/80   Pulse: 79   Resp: 16   Temp: 98.4 °F (36.9 °C)   Weight: 140 lb 9.6 oz (63.8 kg)   Height: 5' 4\" (1.626 m)     Body mass index is 24.13 kg/m². GENERAL YOLY: Conversant, alert, oriented. No acute distress. HEENT: HEENT. No thyroid enlargement. No JVD. Neck: Supple without restrictions. RESPIRATORY: Clear to auscultation and percussion to the bases. No CVAT. CARDIOVASC: RRR without murmur/rub.    GASTROINT: soft, non-tender, without masses or organomegaly   MUSCULOSKEL: no joint tenderness, deformity or swelling   EXTREMITIES: extremities normal, atraumatic, no cyanosis or edema   PELVIC: Normal external genitalia. Normal vagina. Absent cervix and uterus. Adnexa not palpable. RECTAL: Deferred   ZAK SURVEY: Mildly enlarged and tender lymph nodes in bilateral groins. NEURO: Grossly intact. No acute deficit. Lab Data:    Lab Results   Component Value Date/Time    WBC 5.5 03/07/2019 08:26 PM    HGB 13.0 03/07/2019 08:26 PM    HCT 40.3 03/07/2019 08:26 PM    PLATELET 626 38/67/6031 08:26 PM    MCV 97.3 03/07/2019 08:26 PM     Lab Results   Component Value Date/Time    Sodium 141 03/07/2019 08:26 PM    Potassium 3.6 03/07/2019 08:26 PM    Chloride 107 03/07/2019 08:26 PM    CO2 27 03/07/2019 08:26 PM    Anion gap 7 03/07/2019 08:26 PM    Glucose 128 (H) 03/07/2019 08:26 PM    BUN 13 03/07/2019 08:26 PM    Creatinine 0.73 03/07/2019 08:26 PM    BUN/Creatinine ratio 18 03/07/2019 08:26 PM    GFR est AA >60 03/07/2019 08:26 PM    GFR est non-AA >60 03/07/2019 08:26 PM    Calcium 9.0 03/07/2019 08:26 PM         CT of abdomen/pelvis (3/7/19)  LUNG BASES: 3 mm peripheral nodule in the left lower lobe image 4. 3 mm mm  pleural-based nodule  Left lower lobe image 11 3 mm right lower lobe nodule image 3  INCIDENTALLY IMAGED HEART AND MEDIASTINUM: Unremarkable. LIVER: No mass or biliary dilatation. Mild periportal edema, a nonspecific  finding. GALLBLADDER: Surgically absent. SPLEEN: No mass. PANCREAS: No mass or ductal dilatation. ADRENALS: Unremarkable. KIDNEYS: No mass, calculus, or hydronephrosis. STOMACH: Status post gastric bypass surgery, with contrast filling the gastric  pouch and proximal small bowel and no evidence for contrast in the excluded  gastric remnant. No extravasation of contrast. No fluid collection or abscess  suggested. SMALL BOWEL: No dilatation or wall thickening. COLON: No dilatation or wall thickening. APPENDIX: Not seen.   PERITONEUM: No ascites or pneumoperitoneum. RETROPERITONEUM: No lymphadenopathy or aortic aneurysm. REPRODUCTIVE ORGANS: Not seen. URINARY BLADDER: No mass or calculus. BONES: No destructive bone lesion. ADDITIONAL COMMENTS: N/A     IMPRESSION:  1. Status post gastric bypass surgery with no leak, abscess, obstruction,  gastric gastric fistula or other complicating feature demonstrated. 2. Tiny pulmonary nodules in the lower lobes of uncertain if any significance. Guidelines by the Fleischner society (Radiology 2005: 400:240-914) suggest that  patients with a low risk for lung cancer who have nodules less than or equal to  4 mm in diameter require no follow-up. In patients with a  higher risk, such as  smokers, follow-up is recommended in one year. Patients with a known malignancy  are at increased risk for metastasis and should receive three month follow-up.         IMPRESSION/PLAN:  Farzad Hinton is a 34 y.o. female with a working diagnosis of bilateral inguinal lymphadenopathy, along with a family history of breast cancer and BRCA 2 mutation. I reviewed with Farzad Hinton her medical records, physical exam, and review of symptoms. There are two issues that we are dealing with, but with possible overlap. As for her family history, Khris Holt needs genetic testing. She meets all of the requirements for testing considering her history. I counseled her on the importance of testing, the benefits of testing, and the potential risks of testing. If she is found to be positive, we can then address risk-reducing surgery and/or enhanced screening for both breast and ovarian cancer. If negative, she can be somewhat reassured that she is not at an increased risk for either breast or ovarian cancer above that of the general population. Testing was performed and submitted during her visit today. As for the enlarged and tender nodes, the only way to know for sure as to the etiology is to perform a biopsy.   We could perform a needle biopsy percutaneously or we could proceed with an excisional biopsy. I recommended the later. This would give us more tissue for pathology and a better chance of definitive diagnosis. The differential diagnosis includes both benign and malignant etiologies. I suspect it is from a benign issue, but I have seen a BRCA mutated patient with ovarian cancer present with groin adenopathy before. She was counseled on the risks, benefits, indications, and alternatives of surgery. Her questions were answered and she wishes to proceed.         Signed By: Marielos Benito MD     3/8/2019/1:08 PM

## 2019-03-08 NOTE — ED NOTES
Pt given discharge instructions, patient education, prescriptions and follow-up information. Pt states understanding - all questions answered. Pt discharged to home in private vehicle, ambulatory. Pt A&Ox4, RA, with pain controlled. IV removed - tip intact.

## 2019-03-08 NOTE — DISCHARGE INSTRUCTIONS
Patient Education        Abdominal Pain: Care Instructions  Your Care Instructions    Abdominal pain has many possible causes. Some aren't serious and get better on their own in a few days. Others need more testing and treatment. If your pain continues or gets worse, you need to be rechecked and may need more tests to find out what is wrong. You may need surgery to correct the problem. Don't ignore new symptoms, such as fever, nausea and vomiting, urination problems, pain that gets worse, and dizziness. These may be signs of a more serious problem. Your doctor may have recommended a follow-up visit in the next 8 to 12 hours. If you are not getting better, you may need more tests or treatment. The doctor has checked you carefully, but problems can develop later. If you notice any problems or new symptoms, get medical treatment right away. Follow-up care is a key part of your treatment and safety. Be sure to make and go to all appointments, and call your doctor if you are having problems. It's also a good idea to know your test results and keep a list of the medicines you take. How can you care for yourself at home? · Rest until you feel better. · To prevent dehydration, drink plenty of fluids, enough so that your urine is light yellow or clear like water. Choose water and other caffeine-free clear liquids until you feel better. If you have kidney, heart, or liver disease and have to limit fluids, talk with your doctor before you increase the amount of fluids you drink. · If your stomach is upset, eat mild foods, such as rice, dry toast or crackers, bananas, and applesauce. Try eating several small meals instead of two or three large ones. · Wait until 48 hours after all symptoms have gone away before you have spicy foods, alcohol, and drinks that contain caffeine. · Do not eat foods that are high in fat. · Avoid anti-inflammatory medicines such as aspirin, ibuprofen (Advil, Motrin), and naproxen (Aleve). These can cause stomach upset. Talk to your doctor if you take daily aspirin for another health problem. When should you call for help? Call 911 anytime you think you may need emergency care. For example, call if:    · You passed out (lost consciousness).     · You pass maroon or very bloody stools.     · You vomit blood or what looks like coffee grounds.     · You have new, severe belly pain.    Call your doctor now or seek immediate medical care if:    · Your pain gets worse, especially if it becomes focused in one area of your belly.     · You have a new or higher fever.     · Your stools are black and look like tar, or they have streaks of blood.     · You have unexpected vaginal bleeding.     · You have symptoms of a urinary tract infection. These may include:  ? Pain when you urinate. ? Urinating more often than usual.  ? Blood in your urine.     · You are dizzy or lightheaded, or you feel like you may faint.    Watch closely for changes in your health, and be sure to contact your doctor if:    · You are not getting better after 1 day (24 hours). Where can you learn more? Go to http://zoe-ilan.info/. Enter Z289 in the search box to learn more about \"Abdominal Pain: Care Instructions. \"  Current as of: September 23, 2018  Content Version: 11.9  © 5026-2316 [employer]. Care instructions adapted under license by [employer] (which disclaims liability or warranty for this information). If you have questions about a medical condition or this instruction, always ask your healthcare professional. [place] [address] any warranty or liability for your use of this information. Patient Education        Upper GI Endoscopy: Before Your Procedure  What is an upper GI endoscopy?   An upper gastrointestinal (or GI) endoscopy is a test that allows your doctor to look at the inside of your esophagus, stomach, and the first part of your small intestine, called the duodenum. The esophagus is the tube that carries food to your stomach. The doctor uses a thin, lighted tube that bends. It is called an endoscope, or scope. The doctor puts the tip of the scope in your mouth and gently moves it down your throat. The scope is a flexible video camera. The doctor looks at a monitor (like a TV set or a computer screen) as he or she moves the scope. A doctor may do this test, which is also called a procedure, to look for ulcers, tumors, infection, or bleeding. It also can be used to look for signs of acid backing up into your esophagus. This is called gastroesophageal reflux disease, or GERD. The doctor can use the scope to take a sample of tissue for study (a biopsy). The doctor also can use the scope to take out growths or stop bleeding. Follow-up care is a key part of your treatment and safety. Be sure to make and go to all appointments, and call your doctor if you are having problems. It's also a good idea to know your test results and keep a list of the medicines you take. What happens before the procedure?   Preparing for the procedure    · Understand exactly what procedure is planned, along with the risks, benefits, and other options. · Tell your doctors ALL the medicines, vitamins, supplements, and herbal remedies you take. Some of these can increase the risk of bleeding or interact with anesthesia.     · If you take blood thinners, such as warfarin (Coumadin), clopidogrel (Plavix), or aspirin, be sure to talk to your doctor. He or she will tell you if you should stop taking these medicines before your procedure. Make sure that you understand exactly what your doctor wants you to do.     · Your doctor will tell you which medicines to take or stop before your procedure. You may need to stop taking certain medicines a week or more before the procedure. So talk to your doctor as soon as you can.     · If you have an advance directive, let your doctor know.  It may include a living will and a durable power of  for health care. Bring a copy to the hospital. If you don't have one, you may want to prepare one. It lets your doctor and loved ones know your health care wishes. Doctors advise that everyone prepare these papers before any type of surgery or procedure. Procedures can be stressful. This information will help you understand what you can expect. And it will help you safely prepare for your procedure. What happens on the day of the procedure? · Follow the instructions exactly about when to stop eating and drinking. If you don't, your procedure may be canceled. If your doctor told you to take your medicines on the day of the procedure, take them with only a sip of water.     · Take a bath or shower before you come in for your procedure. Do not apply lotions, perfumes, deodorants, or nail polish.     · Take off all jewelry and piercings. And take out contact lenses, if you wear them.    At the hospital or surgery center   · Bring a picture ID.     · The test may take 15 to 30 minutes.     · The doctor may spray medicine on the back of your throat to numb it. You also will get medicine to prevent pain and to relax you.     · You will lie on your left side. The doctor will put the scope in your mouth and toward the back of your throat. The doctor will tell you when to swallow. This helps the scope move down your throat. You will be able to breathe normally. The doctor will move the scope down your esophagus into your stomach. The doctor also may look at the duodenum.     · If your doctor wants to take a sample of tissue for a biopsy, he or she may use small surgical tools, which are put into the scope, to cut off some tissue. You will not feel a biopsy, if one is taken.  The doctor also can use the tools to stop bleeding or to do other treatments, if needed.     · You will stay at the hospital or surgery center for 1 to 2 hours until the medicine you were given wears off. Going home   · Be sure you have someone to drive you home. Anesthesia and pain medicine make it unsafe for you to drive.     · You will be given more specific instructions about recovering from your procedure. They will cover things like diet, wound care, follow-up care, driving, and getting back to your normal routine. When should you call your doctor? · You have questions or concerns.     · You don't understand how to prepare for your procedure.     · You become ill before the procedure (such as fever, flu, or a cold).     · You need to reschedule or have changed your mind about having the procedure. Where can you learn more? Go to http://zoeFieldLensilan.info/. Enter P790 in the search box to learn more about \"Upper GI Endoscopy: Before Your Procedure. \"  Current as of: March 27, 2018  Content Version: 11.9  © 2440-6495 [employer]. Care instructions adapted under license by [employer] (which disclaims liability or warranty for this information). If you have questions about a medical condition or this instruction, always ask your healthcare professional. [place] [address] any warranty or liability for your use of this information. Patient Education        Laparoscopic Jaspreet-en-Y Gastric Bypass: What to Expect at Home  Your Recovery  A Jaspreet-en-Y (say \"patric-en-why\") gastric bypass is surgery to make the stomach smaller and change the connection between the stomach and the intestines. It is done to help people lose weight. The surgery limits the amount of food the stomach can hold. This helps you eat less and feel full sooner. The cuts (incisions) the doctor made in your belly will probably be sore for several days after the surgery. If you have stitches, the doctor will take these out at your follow-up visit. You probably will lose weight very quickly in the first few months after surgery. As time goes on, your weight loss will slow down. You can expect most of your weight loss to happen in the first 12 months after your surgery. You will have regular doctor's appointments during this time to check how you are doing. It is important to think of this surgery as a tool to help you lose weight. It is not an instant fix. You will still need to eat a healthy diet and get regular exercise. This will help you reach your weight goal and avoid regaining the weight you lose. It is common to have many different emotions after this surgery. You may feel happy or excited as you begin to lose weight. But you may also feel overwhelmed or frustrated by the changes that you have to make in your diet, activity, and lifestyle. Talk with your doctor if you have concerns or questions. This care sheet gives you a general idea about how long it will take for you to recover. But each person recovers at a different pace. Follow the steps below to get better as quickly as possible. How can you care for yourself at home? Activity    · Rest when you feel tired. Getting enough sleep will help you recover.     · Try to walk each day. Start by walking a little more than you did the day before. Bit by bit, increase the amount you walk. Walking boosts blood flow and helps prevent pneumonia and constipation.     · Avoid strenuous activities, such as bicycle riding, jogging, weight lifting, or aerobic exercise, until your doctor says it is okay.     · Until your doctor says it is okay, avoid lifting anything that would make you strain. This may include a child, heavy grocery bags and milk containers, a heavy briefcase or backpack, cat litter or dog food bags, or a vacuum .     · Hold a pillow over your incisions when you cough or take deep breaths. This will support your belly and decrease your pain.     · Do breathing exercises at home as instructed by your doctor.  This will help prevent pneumonia.     · Ask your doctor when you can drive again.     · You will probably need to take 2 to 4 weeks off from work. It depends on the type of work you do and how you feel. You will probably return to normal activities within 3 to 5 weeks.     · You may shower, if your doctor okays it. Pat the incisions dry. Do not take a bath for the first 2 weeks, or until your doctor tells you it is okay.     · Ask your doctor when it is okay for you to have sex. Diet    · Your doctor will give you specific instructions about what to eat after the surgery. For the first 2 to 6 weeks, you will need to follow a liquid or soft diet. Bit by bit, you will be able to add solid foods back into your diet.     · Your doctor may recommend that you work with a dietitian to plan healthy meals that give you enough protein, vitamins, and minerals while you are losing weight. Even with a healthy diet, you probably will need to take vitamin and mineral supplements for the rest of your life.     · At first you may feel full after just a few sips of water or other liquid. It is important to try to sip water throughout the day to avoid becoming dehydrated.     · You may notice that your bowel movements are not regular right after your surgery. This is common. Try to avoid constipation and straining with bowel movements.     · Sometimes the stomach empties food into the small intestine too quickly. This is called dumping syndrome. It can cause diarrhea and make you feel faint, shaky, and nauseated. It also can make it hard for your body to get enough nutrition. ? High-sugar foods--such as desserts, soda pop, and fruit juices--are most likely to cause dumping syndrome. Avoid high-sugar foods, or use products that have artificial sweeteners if sugar gives you a problem. ? Do not drink liquids within a half hour before eating and up to an hour after eating. Liquids move food even more quickly into the small intestine. Quick emptying of the stomach increases the chance of diarrhea. ? Eat slowly.  Try to chew each bite about 20 times. Allow 20 to 30 minutes for each meal.  ? Eat 5 or 6 small meals or snacks a day. This may keep you from feeling too full after eating and may reduce problems with diarrhea and dumping syndrome. Medicines    · Your doctor will tell you if and when you can restart your medicines. He or she will also give you instructions about taking any new medicines.     · If you take blood thinners, such as warfarin (Coumadin), clopidogrel (Plavix), or aspirin, be sure to talk to your doctor. He or she will tell you if and when to start taking those medicines again. Make sure that you understand exactly what your doctor wants you to do.     · Be safe with medicines. Take pain medicines exactly as directed. ? If the doctor gave you a prescription medicine for pain, take it as prescribed. ? If you are not taking a prescription pain medicine, ask your doctor if you can take an over-the-counter medicine.     · If you think your pain medicine is making you sick to your stomach:  ? Take your medicine after meals (unless your doctor has told you not to). ? Ask your doctor for a different pain medicine.     · If your doctor prescribed antibiotics, take them as directed. Do not stop taking them just because you feel better. You need to take the full course of antibiotics. Incision care    · If you have strips of tape on the incisions, leave the tape on for a week or until it falls off.     · Wash the area daily with warm, soapy water, and pat it dry. Don't use hydrogen peroxide or alcohol, which can slow healing. You may cover the area with a gauze bandage if it weeps or rubs against clothing. Change the bandage every day.     · Keep the area clean and dry. Follow-up care is a key part of your treatment and safety. Be sure to make and go to all appointments, and call your doctor if you are having problems. It's also a good idea to know your test results and keep a list of the medicines you take.   When should you call for help?  Call 911 anytime you think you may need emergency care. For example, call if:    · You passed out (lost consciousness).     · You are short of breath.    Call your doctor now or seek immediate medical care if:    · You have pain that does not get better after you take pain medicine.     · You cannot pass stool or gas.     · You are sick to your stomach and cannot drink fluids.     · You have loose stitches, or your incision comes open.     · You have signs of a blood clot, such as:  ? Pain in your calf, back of the knee, thigh, or groin. ? Redness and swelling in your leg or groin.     · You have signs of infection, such as:  ? Increased pain, swelling, warmth, or redness. ? Red streaks leading from the incision. ? Pus draining from the incision. ? A fever.    Watch closely for changes in your health, and be sure to contact your doctor if you have any problems. Where can you learn more? Go to http://zoe-ilan.info/. Enter Y354 in the search box to learn more about \"Laparoscopic Jaspreet-en-Y Gastric Bypass: What to Expect at Home. \"  Current as of: June 25, 2018  Content Version: 11.9  © 1385-8711 [employer]. Care instructions adapted under license by [employer] (which disclaims liability or warranty for this information). If you have questions about a medical condition or this instruction, always ask your healthcare professional. [place] [address] any warranty or liability for your use of this information.

## 2019-03-08 NOTE — PROGRESS NOTES
New patient referred by Sparkle Lala for hx of endometrial cancer, gastric bypass, umbilical hernia repair, and reports enlarged inguinal and axillary lymph nodes. Patient had a ct scan on 3/7/19.

## 2019-03-12 ENCOUNTER — ANESTHESIA EVENT (OUTPATIENT)
Dept: SURGERY | Age: 34
End: 2019-03-12
Payer: OTHER GOVERNMENT

## 2019-03-12 RX ORDER — BISMUTH SUBSALICYLATE 262 MG
1 TABLET,CHEWABLE ORAL DAILY
COMMUNITY

## 2019-03-12 RX ORDER — ALBUTEROL SULFATE 90 UG/1
2 AEROSOL, METERED RESPIRATORY (INHALATION)
COMMUNITY

## 2019-03-13 NOTE — PROGRESS NOTES
Subjective:      Chente Reynolds is a 34 y.o. female presents for postop care following LRYGBP at [facility]; post-op course complicated by perforated marginal ulcer managed laparoscopically. She has since relocated to [place]; she has seen Dr. Lindsey Villalta at [facility] and undergone cholecystectomy. She has redeveloped epigastric pain, worse with meal, with associated regurgitation, nausea, and vomiting; no hematemesis. She notes fatigue and hair loss. She also notes bulge at umbilicus-prior umbilical hernia repair complicated by wound infection. Objective:     Visit Vitals  /83   Pulse 98   Temp 98.1 °F (36.7 °C) (Oral)   Resp 18   Ht 5' 4.5\" (1.638 m)   Wt 134 lb 9.6 oz (61.1 kg)   SpO2 100%   BMI 22.75 kg/m²       General:  alert, cooperative, no distress, appears stated age   Abdomen: soft, non-distended; diastasis recti present; tender, reducible umbilical hernia   Incision:   all well healed     Assessment:     Epigastric pain, nausea, vomiting following gastric bypass, history of perforated marginal ulcer-suspect recurrent marginal ulcer. Recurrent umbilical hernia-no incarceration,    Plan:     1. Continue current medications. Protonix 40 mg BID, Sucralfate QID. 2. Diet as tolerated. 3. Gastroenterology evaluation pending. ADDENDUM  It was later brought to my attention that the patient, when attempting to schedule follow-up appointment with me for follow-up after Gastroenterology appointment, was disruptive, rude, and used profanity; this is the second instance of this form of behavior (also occurred 3/4/19 after seeing Dr. Arleen Jean-Baptiste). We will refer to Risk Management for referral out of our system as this behavior prevents a meaningful, trusting relationship between patient and our practice. We are happy to provide alternate provider contact information who specialize in bariatric surgery.

## 2019-03-15 ENCOUNTER — ANESTHESIA (OUTPATIENT)
Dept: SURGERY | Age: 34
End: 2019-03-15
Payer: OTHER GOVERNMENT

## 2019-03-15 ENCOUNTER — HOSPITAL ENCOUNTER (OUTPATIENT)
Age: 34
Setting detail: OUTPATIENT SURGERY
Discharge: HOME | End: 2019-03-15
Attending: OBSTETRICS & GYNECOLOGY | Admitting: OBSTETRICS & GYNECOLOGY
Payer: OTHER GOVERNMENT

## 2019-03-15 VITALS
WEIGHT: 134 LBS | BODY MASS INDEX: 22.88 KG/M2 | HEIGHT: 64 IN | RESPIRATION RATE: 12 BRPM | OXYGEN SATURATION: 97 % | TEMPERATURE: 98.4 F | SYSTOLIC BLOOD PRESSURE: 104 MMHG | HEART RATE: 87 BPM | DIASTOLIC BLOOD PRESSURE: 69 MMHG

## 2019-03-15 DIAGNOSIS — G89.18 ACUTE POSTOPERATIVE PAIN: Primary | ICD-10-CM

## 2019-03-15 LAB — HCG UR QL: NEGATIVE

## 2019-03-15 PROCEDURE — 76210000020 HC REC RM PH II FIRST 0.5 HR: Performed by: OBSTETRICS & GYNECOLOGY

## 2019-03-15 PROCEDURE — 88185 FLOWCYTOMETRY/TC ADD-ON: CPT

## 2019-03-15 PROCEDURE — 81025 URINE PREGNANCY TEST: CPT

## 2019-03-15 PROCEDURE — 77030032490 HC SLV COMPR SCD KNE COVD -B: Performed by: OBSTETRICS & GYNECOLOGY

## 2019-03-15 PROCEDURE — 74011250637 HC RX REV CODE- 250/637

## 2019-03-15 PROCEDURE — 88305 TISSUE EXAM BY PATHOLOGIST: CPT

## 2019-03-15 PROCEDURE — 76010000138 HC OR TIME 0.5 TO 1 HR: Performed by: OBSTETRICS & GYNECOLOGY

## 2019-03-15 PROCEDURE — 88342 IMHCHEM/IMCYTCHM 1ST ANTB: CPT

## 2019-03-15 PROCEDURE — 74011250636 HC RX REV CODE- 250/636

## 2019-03-15 PROCEDURE — 76060000032 HC ANESTHESIA 0.5 TO 1 HR: Performed by: OBSTETRICS & GYNECOLOGY

## 2019-03-15 PROCEDURE — 77030010507 HC ADH SKN DERMBND J&J -B: Performed by: OBSTETRICS & GYNECOLOGY

## 2019-03-15 PROCEDURE — 74011000250 HC RX REV CODE- 250: Performed by: OBSTETRICS & GYNECOLOGY

## 2019-03-15 PROCEDURE — 88341 IMHCHEM/IMCYTCHM EA ADD ANTB: CPT

## 2019-03-15 PROCEDURE — 74011000250 HC RX REV CODE- 250

## 2019-03-15 PROCEDURE — 74011250636 HC RX REV CODE- 250/636: Performed by: ANESTHESIOLOGY

## 2019-03-15 PROCEDURE — 74011000258 HC RX REV CODE- 258: Performed by: OBSTETRICS & GYNECOLOGY

## 2019-03-15 PROCEDURE — 77030011640 HC PAD GRND REM COVD -A: Performed by: OBSTETRICS & GYNECOLOGY

## 2019-03-15 PROCEDURE — 76210000016 HC OR PH I REC 1 TO 1.5 HR: Performed by: OBSTETRICS & GYNECOLOGY

## 2019-03-15 PROCEDURE — 77030002933 HC SUT MCRYL J&J -A: Performed by: OBSTETRICS & GYNECOLOGY

## 2019-03-15 PROCEDURE — 77030031139 HC SUT VCRL2 J&J -A: Performed by: OBSTETRICS & GYNECOLOGY

## 2019-03-15 PROCEDURE — 88184 FLOWCYTOMETRY/ TC 1 MARKER: CPT

## 2019-03-15 PROCEDURE — 88333 PATH CONSLTJ SURG CYTO XM 1: CPT

## 2019-03-15 PROCEDURE — 74011250637 HC RX REV CODE- 250/637: Performed by: ANESTHESIOLOGY

## 2019-03-15 PROCEDURE — 77030010509 HC AIRWY LMA MSK TELE -A: Performed by: ANESTHESIOLOGY

## 2019-03-15 RX ORDER — SCOLOPAMINE TRANSDERMAL SYSTEM 1 MG/1
1 PATCH, EXTENDED RELEASE TRANSDERMAL
Status: DISCONTINUED | OUTPATIENT
Start: 2019-03-15 | End: 2019-03-15 | Stop reason: SDUPTHER

## 2019-03-15 RX ORDER — SODIUM CHLORIDE 9 MG/ML
25 INJECTION, SOLUTION INTRAVENOUS CONTINUOUS
Status: DISCONTINUED | OUTPATIENT
Start: 2019-03-15 | End: 2019-03-15 | Stop reason: HOSPADM

## 2019-03-15 RX ORDER — MIDAZOLAM HYDROCHLORIDE 1 MG/ML
1 INJECTION, SOLUTION INTRAMUSCULAR; INTRAVENOUS AS NEEDED
Status: DISCONTINUED | OUTPATIENT
Start: 2019-03-15 | End: 2019-03-15 | Stop reason: HOSPADM

## 2019-03-15 RX ORDER — OXYCODONE HYDROCHLORIDE 5 MG/1
5 TABLET ORAL AS NEEDED
Status: DISCONTINUED | OUTPATIENT
Start: 2019-03-15 | End: 2019-03-15 | Stop reason: HOSPADM

## 2019-03-15 RX ORDER — OXYCODONE AND ACETAMINOPHEN 5; 325 MG/1; MG/1
1 TABLET ORAL
Qty: 20 TAB | Refills: 0 | Status: SHIPPED | OUTPATIENT
Start: 2019-03-15 | End: 2019-03-20

## 2019-03-15 RX ORDER — PROPOFOL 10 MG/ML
INJECTION, EMULSION INTRAVENOUS AS NEEDED
Status: DISCONTINUED | OUTPATIENT
Start: 2019-03-15 | End: 2019-03-15 | Stop reason: HOSPADM

## 2019-03-15 RX ORDER — ONDANSETRON 2 MG/ML
INJECTION INTRAMUSCULAR; INTRAVENOUS AS NEEDED
Status: DISCONTINUED | OUTPATIENT
Start: 2019-03-15 | End: 2019-03-15 | Stop reason: HOSPADM

## 2019-03-15 RX ORDER — GLYCOPYRROLATE 0.2 MG/ML
INJECTION INTRAMUSCULAR; INTRAVENOUS AS NEEDED
Status: DISCONTINUED | OUTPATIENT
Start: 2019-03-15 | End: 2019-03-15 | Stop reason: HOSPADM

## 2019-03-15 RX ORDER — ONDANSETRON 2 MG/ML
4 INJECTION INTRAMUSCULAR; INTRAVENOUS AS NEEDED
Status: DISCONTINUED | OUTPATIENT
Start: 2019-03-15 | End: 2019-03-15 | Stop reason: HOSPADM

## 2019-03-15 RX ORDER — SODIUM CHLORIDE, SODIUM LACTATE, POTASSIUM CHLORIDE, CALCIUM CHLORIDE 600; 310; 30; 20 MG/100ML; MG/100ML; MG/100ML; MG/100ML
INJECTION, SOLUTION INTRAVENOUS
Status: DISCONTINUED | OUTPATIENT
Start: 2019-03-15 | End: 2019-03-15 | Stop reason: HOSPADM

## 2019-03-15 RX ORDER — LIDOCAINE HYDROCHLORIDE 20 MG/ML
INJECTION, SOLUTION EPIDURAL; INFILTRATION; INTRACAUDAL; PERINEURAL AS NEEDED
Status: DISCONTINUED | OUTPATIENT
Start: 2019-03-15 | End: 2019-03-15 | Stop reason: HOSPADM

## 2019-03-15 RX ORDER — LIDOCAINE HYDROCHLORIDE 10 MG/ML
0.1 INJECTION, SOLUTION EPIDURAL; INFILTRATION; INTRACAUDAL; PERINEURAL AS NEEDED
Status: DISCONTINUED | OUTPATIENT
Start: 2019-03-15 | End: 2019-03-15 | Stop reason: HOSPADM

## 2019-03-15 RX ORDER — FENTANYL CITRATE 50 UG/ML
50 INJECTION, SOLUTION INTRAMUSCULAR; INTRAVENOUS AS NEEDED
Status: DISCONTINUED | OUTPATIENT
Start: 2019-03-15 | End: 2019-03-15 | Stop reason: HOSPADM

## 2019-03-15 RX ORDER — FENTANYL CITRATE 50 UG/ML
INJECTION, SOLUTION INTRAMUSCULAR; INTRAVENOUS AS NEEDED
Status: DISCONTINUED | OUTPATIENT
Start: 2019-03-15 | End: 2019-03-15 | Stop reason: HOSPADM

## 2019-03-15 RX ORDER — SCOLOPAMINE TRANSDERMAL SYSTEM 1 MG/1
1 PATCH, EXTENDED RELEASE TRANSDERMAL ONCE
Status: DISCONTINUED | OUTPATIENT
Start: 2019-03-15 | End: 2019-03-15

## 2019-03-15 RX ORDER — MIDAZOLAM HYDROCHLORIDE 1 MG/ML
INJECTION, SOLUTION INTRAMUSCULAR; INTRAVENOUS AS NEEDED
Status: DISCONTINUED | OUTPATIENT
Start: 2019-03-15 | End: 2019-03-15 | Stop reason: HOSPADM

## 2019-03-15 RX ORDER — OXYCODONE AND ACETAMINOPHEN 5; 325 MG/1; MG/1
TABLET ORAL
Status: COMPLETED
Start: 2019-03-15 | End: 2019-03-15

## 2019-03-15 RX ORDER — FENTANYL CITRATE 50 UG/ML
25 INJECTION, SOLUTION INTRAMUSCULAR; INTRAVENOUS
Status: DISCONTINUED | OUTPATIENT
Start: 2019-03-15 | End: 2019-03-15 | Stop reason: HOSPADM

## 2019-03-15 RX ORDER — DEXAMETHASONE SODIUM PHOSPHATE 4 MG/ML
INJECTION, SOLUTION INTRA-ARTICULAR; INTRALESIONAL; INTRAMUSCULAR; INTRAVENOUS; SOFT TISSUE AS NEEDED
Status: DISCONTINUED | OUTPATIENT
Start: 2019-03-15 | End: 2019-03-15 | Stop reason: HOSPADM

## 2019-03-15 RX ORDER — ROPIVACAINE HYDROCHLORIDE 5 MG/ML
150 INJECTION, SOLUTION EPIDURAL; INFILTRATION; PERINEURAL AS NEEDED
Status: DISCONTINUED | OUTPATIENT
Start: 2019-03-15 | End: 2019-03-15 | Stop reason: HOSPADM

## 2019-03-15 RX ORDER — OXYCODONE AND ACETAMINOPHEN 5; 325 MG/1; MG/1
1 TABLET ORAL ONCE
Status: COMPLETED | OUTPATIENT
Start: 2019-03-15 | End: 2019-03-15

## 2019-03-15 RX ORDER — MIDAZOLAM HYDROCHLORIDE 1 MG/ML
0.5 INJECTION, SOLUTION INTRAMUSCULAR; INTRAVENOUS
Status: DISCONTINUED | OUTPATIENT
Start: 2019-03-15 | End: 2019-03-15 | Stop reason: HOSPADM

## 2019-03-15 RX ORDER — SODIUM CHLORIDE, SODIUM LACTATE, POTASSIUM CHLORIDE, CALCIUM CHLORIDE 600; 310; 30; 20 MG/100ML; MG/100ML; MG/100ML; MG/100ML
100 INJECTION, SOLUTION INTRAVENOUS CONTINUOUS
Status: DISCONTINUED | OUTPATIENT
Start: 2019-03-15 | End: 2019-03-15 | Stop reason: HOSPADM

## 2019-03-15 RX ORDER — SCOLOPAMINE TRANSDERMAL SYSTEM 1 MG/1
1 PATCH, EXTENDED RELEASE TRANSDERMAL ONCE
Status: DISCONTINUED | OUTPATIENT
Start: 2019-03-15 | End: 2019-03-15 | Stop reason: HOSPADM

## 2019-03-15 RX ORDER — SODIUM CHLORIDE, SODIUM LACTATE, POTASSIUM CHLORIDE, CALCIUM CHLORIDE 600; 310; 30; 20 MG/100ML; MG/100ML; MG/100ML; MG/100ML
1000 INJECTION, SOLUTION INTRAVENOUS CONTINUOUS
Status: DISCONTINUED | OUTPATIENT
Start: 2019-03-15 | End: 2019-03-15 | Stop reason: HOSPADM

## 2019-03-15 RX ORDER — MORPHINE SULFATE 10 MG/ML
2 INJECTION, SOLUTION INTRAMUSCULAR; INTRAVENOUS
Status: DISCONTINUED | OUTPATIENT
Start: 2019-03-15 | End: 2019-03-15 | Stop reason: HOSPADM

## 2019-03-15 RX ADMIN — DEXAMETHASONE SODIUM PHOSPHATE 8 MG: 4 INJECTION, SOLUTION INTRA-ARTICULAR; INTRALESIONAL; INTRAMUSCULAR; INTRAVENOUS; SOFT TISSUE at 11:26

## 2019-03-15 RX ADMIN — OXYCODONE AND ACETAMINOPHEN 1 TABLET: 5; 325 TABLET ORAL at 13:15

## 2019-03-15 RX ADMIN — GLYCOPYRROLATE 0.6 MG: 0.2 INJECTION INTRAMUSCULAR; INTRAVENOUS at 11:34

## 2019-03-15 RX ADMIN — CEFOTETAN DISODIUM 2 G: 2 INJECTION, POWDER, FOR SOLUTION INTRAMUSCULAR; INTRAVENOUS at 11:28

## 2019-03-15 RX ADMIN — LIDOCAINE HYDROCHLORIDE 80 MG: 20 INJECTION, SOLUTION EPIDURAL; INFILTRATION; INTRACAUDAL; PERINEURAL at 11:24

## 2019-03-15 RX ADMIN — PROPOFOL 200 MG: 10 INJECTION, EMULSION INTRAVENOUS at 11:24

## 2019-03-15 RX ADMIN — MIDAZOLAM HYDROCHLORIDE 2 MG: 1 INJECTION, SOLUTION INTRAMUSCULAR; INTRAVENOUS at 11:17

## 2019-03-15 RX ADMIN — SODIUM CHLORIDE, SODIUM LACTATE, POTASSIUM CHLORIDE, CALCIUM CHLORIDE: 600; 310; 30; 20 INJECTION, SOLUTION INTRAVENOUS at 11:17

## 2019-03-15 RX ADMIN — ONDANSETRON 4 MG: 2 INJECTION INTRAMUSCULAR; INTRAVENOUS at 13:15

## 2019-03-15 RX ADMIN — ONDANSETRON 4 MG: 2 INJECTION INTRAMUSCULAR; INTRAVENOUS at 11:49

## 2019-03-15 RX ADMIN — FENTANYL CITRATE 50 MCG: 50 INJECTION, SOLUTION INTRAMUSCULAR; INTRAVENOUS at 11:26

## 2019-03-15 RX ADMIN — FENTANYL CITRATE 25 MCG: 50 INJECTION INTRAMUSCULAR; INTRAVENOUS at 13:00

## 2019-03-15 NOTE — ANESTHESIA PREPROCEDURE EVALUATION
Anesthetic History     PONV          Review of Systems / Medical History  Patient summary reviewed, nursing notes reviewed and pertinent labs reviewed    Pulmonary            Asthma        Neuro/Psych         Psychiatric history     Cardiovascular  Within defined limits                     GI/Hepatic/Renal           PUD     Endo/Other  Within defined limits           Other Findings              Physical Exam    Airway  Mallampati: II  TM Distance: > 6 cm  Neck ROM: normal range of motion   Mouth opening: Normal     Cardiovascular  Regular rate and rhythm,  S1 and S2 normal,  no murmur, click, rub, or gallop             Dental  No notable dental hx       Pulmonary  Breath sounds clear to auscultation               Abdominal  GI exam deferred       Other Findings            Anesthetic Plan    ASA: 2  Anesthesia type: general          Induction: Intravenous  Anesthetic plan and risks discussed with: Patient

## 2019-03-15 NOTE — ANESTHESIA POSTPROCEDURE EVALUATION
Post-Anesthesia Evaluation and Assessment    Patient: Elena Vital MRN: [id number]  SSN: [id number]    YOB: 1985  Age: 34 y.o. Sex: female      I have evaluated the patient and they are stable and ready for discharge from the PACU. Cardiovascular Function/Vital Signs  Visit Vitals  BP 94/51   Pulse 86   Temp 36.8 °C (98.2 °F)   Resp 16   Ht 5' 4\" (1.626 m)   Wt 60.8 kg (134 lb)   SpO2 99%   BMI 23.00 kg/m²       Patient is status post General anesthesia for Procedure(s):  EXCISIONAL BIOPSY OF RIGHT GROIN NODE. Nausea/Vomiting: None    Postoperative hydration reviewed and adequate. Pain:  Pain Scale 1: (P) Adult Nonverbal Pain Scale (03/15/19 1220)  Pain Intensity 1: 2 (03/15/19 0911)   Managed    Neurological Status: At baseline    Mental Status, Level of Consciousness: Alert and  oriented to person, place, and time    Pulmonary Status:   O2 Device: Nasal cannula (03/15/19 1225)   Adequate oxygenation and airway patent    Complications related to anesthesia: None    Post-anesthesia assessment completed.  No concerns    Signed By: Amy Morales MD     March 15, 2019              Procedure(s):  EXCISIONAL BIOPSY OF RIGHT GROIN NODE.    <BSHSIANPOST>    Visit Vitals  BP 94/51   Pulse 86   Temp 36.8 °C (98.2 °F)   Resp 16   Ht 5' 4\" (1.626 m)   Wt 60.8 kg (134 lb)   SpO2 99%   BMI 23.00 kg/m²

## 2019-03-15 NOTE — H&P
[address]  P [phone]  F [phone]        Patient ID:  Name:  Aura Weaver  MRN:  [id number]  :  1985/33 y.o. Date:  3/15/2019      HISTORY OF PRESENT ILLNESS:  Aura Weaver is a 34 y.o.  premenopausal female who is being seen for inguinal lymphadenopathy, as well as a family history of breast and uterine cancer. She is referred by Dr. Margo Morocho [place] [address] [place] [facility] [place]. She is s/p laparoscopic hysterectomy in May 2018 for menorrhagia, dysmenorrhea, chronic pelvic pain, and dyspareunia with Dr. Denice Hannah. She apparently had repair of an umbilical hernia at the same time. In  she underwent a gastric [place] surgery [address] and has lost over a hundred pounds. She has noticed some tender and enlarged lymph nodes in her groins since her hysterectomy. They have increased slightly in size. An [place] was [place] [facility] [place], but it really only demonstrated enlarged nodes. I have been asked to see her for further evaluation and management. She has a family history of breast cancer. Both her mother and her grandmother had breast caner. Her mother also had uterine cancer. Her mother was tested and found to be BRCA 2 positive. Juvencio Fitzgerald has not been tested yet. She still has both of her ovaries, as they were left behind at the time of her hysterectomy, which is appropriate considering her age. She also reports other family members with other various forms of cancer. She was actually seen yesterday in the ER at [facility] complaining of abdominal pain, chest pain, and inability to eat or drink much over the last week. The evaluation was essentially negative. She did have a CT scan. Her ovaries looked normal on the scan. The inguinal lymph nodes were visible on the scan, but no comment was made.   She was discharged to follow up with Dr. Mary Dudley, who has been seeing her since she moved to the area. She states she is supposed to have an EGD soon, as well as a colonoscopy. ROS:   and GI review:  Negative  Cardiopulmonary review:  Negative   Musculoskeletal:  Negative    A comprehensive review of systems was negative except for that written in the History of Present Illness. , 10 point ROS      OB/GYN ROS:  Hx of  section x 1  Hx of robotic TLH  Patient denies any abnormal bleeding or vaginal discharge.        Problem List:  Patient Active Problem List    Diagnosis Date Noted    Inguinal lymphadenopathy 2019    Anxiety and depression 2019    FHx: BRCA2 gene positive 2019    Factor [facility] Leiden [facility] [address] [address] 2019    Moderate asthma 2019    S/P cholecystectomy 2019    S/P RADHA (total abdominal hysterectomy) 2019    H/O hernia repair 2019    S/P gastric bypass 2019     PMH:  Past Medical History:   Diagnosis Date    Asthma     Coagulation disorder ([facility] [address]     factor v leiden    Constipation     Depression     ANXIETY    Gastric ulcer     Hair loss     Nausea & vomiting     Poor appetite     S/P gastric bypass 3/4/2019    Sleep trouble     Stomach pain     Tiredness     Weakness       PSH:  Past Surgical History:   Procedure Laterality Date    HX APPENDECTOMY      HX  SECTION      HX CHOLECYSTECTOMY  2018    HX COLONOSCOPY      HX GASTRIC BYPASS  2017    HX HERNIA REPAIR      HX HYSTERECTOMY      HX OTHER SURGICAL  2018    PERF ULCER    HX TUBAL LIGATION        Social History:  Social History     Tobacco Use    Smoking status: Never Smoker    Smokeless tobacco: Former User   Substance Use Topics    Alcohol use: No     Frequency: Never      Family History:  Family History   Problem Relation Age of Onset    Cancer Mother         BREAST, OVARIAN, UTERINE    Heart Disease Mother     Other Mother         Factor 5    Diabetes Father     Heart Disease Father     Hypertension Father     Stroke Father     Cancer Sister         UTERINE    Diabetes Brother     Hypertension Brother     Other Brother         FACTOR V    Cancer Maternal Aunt     Cancer Maternal Uncle         stomach    Psychiatric Disorder Maternal Grandmother     Cancer Maternal Grandmother     Hypertension Paternal Grandmother     Stroke Paternal Grandmother     Hypertension Paternal Grandfather     Stroke Paternal Grandfather     Other Son         AUSTISM    Attention Deficit Hyperactivity Disorder Son     Attention Deficit Hyperactivity Disorder Son     Anesth Problems Neg Hx       Medications: (reviewed)  Current Facility-Administered Medications   Medication Dose Route Frequency    cefoTEtan (CEFOTAN) 2 g in 0.9% sodium chloride (MBP/ADV) 50 mL  2 g IntraVENous ON CALL TO OR    lactated Ringers infusion 1,000 mL  1,000 mL IntraVENous CONTINUOUS    0.9% sodium chloride infusion  25 mL/hr IntraVENous CONTINUOUS    lidocaine (PF) (XYLOCAINE) 10 mg/mL (1 %) injection 0.1 mL  0.1 mL SubCUTAneous PRN    fentaNYL citrate (PF) injection 50 mcg  50 mcg IntraVENous PRN    midazolam (VERSED) injection 1 mg  1 mg IntraVENous PRN    midazolam (VERSED) injection 1 mg  1 mg IntraVENous PRN    ropivacaine (PF) (NAROPIN) 5 mg/mL (0.5 %) injection 150 mg  150 mg Peripheral Nerve Block PRN     Allergies: (reviewed)  Allergies   Allergen Reactions    Coconut Anaphylaxis and Other (comments)    Dm [Dextromethorphan-Guaifenesin] Anaphylaxis and Other (comments)     Asthma attacky          OBJECTIVE:    Physical Exam:  VITAL SIGNS: Vitals:    03/12/19 1131 03/15/19 0911   BP:  113/64   Pulse:  60   Resp:  16   Temp:  98.5 °F (36.9 °C)   SpO2:  100%   Weight: 135 lb (61.2 kg) 134 lb (60.8 kg)   Height: 5' 4\" (1.626 m) 5' 4\" (1.626 m)     Body mass index is 23 kg/m². GENERAL YOLY: Conversant, alert, oriented. No acute distress. HEENT: HEENT. No thyroid enlargement. No JVD. Neck: Supple without restrictions. RESPIRATORY: Clear to auscultation and percussion to the bases. No CVAT. CARDIOVASC: RRR without murmur/rub. GASTROINT: soft, non-tender, without masses or organomegaly   MUSCULOSKEL: no joint tenderness, deformity or swelling   EXTREMITIES: extremities normal, atraumatic, no cyanosis or edema   PELVIC: Normal external genitalia. Normal vagina. Absent cervix and uterus. Adnexa not palpable. RECTAL: Deferred   ZAK SURVEY: Mildly enlarged and tender lymph nodes in bilateral groins. NEURO: Grossly intact. No acute deficit. Lab Data:    Lab Results   Component Value Date/Time    WBC 5.5 03/07/2019 08:26 PM    HGB 13.0 03/07/2019 08:26 PM    HCT 40.3 03/07/2019 08:26 PM    PLATELET 398 52/48/8280 08:26 PM    MCV 97.3 03/07/2019 08:26 PM     Lab Results   Component Value Date/Time    Sodium 141 03/07/2019 08:26 PM    Potassium 3.6 03/07/2019 08:26 PM    Chloride 107 03/07/2019 08:26 PM    CO2 27 03/07/2019 08:26 PM    Anion gap 7 03/07/2019 08:26 PM    Glucose 128 (H) 03/07/2019 08:26 PM    BUN 13 03/07/2019 08:26 PM    Creatinine 0.73 03/07/2019 08:26 PM    BUN/Creatinine ratio 18 03/07/2019 08:26 PM    GFR est AA >60 03/07/2019 08:26 PM    GFR est non-AA >60 03/07/2019 08:26 PM    Calcium 9.0 03/07/2019 08:26 PM         CT of abdomen/pelvis (3/7/19)  LUNG BASES: 3 mm peripheral nodule in the left lower lobe image 4. 3 mm mm  pleural-based nodule  Left lower lobe image 11 3 mm right lower lobe nodule image 3  INCIDENTALLY IMAGED HEART AND MEDIASTINUM: Unremarkable. LIVER: No mass or biliary dilatation. Mild periportal edema, a nonspecific  finding. GALLBLADDER: Surgically absent. SPLEEN: No mass. PANCREAS: No mass or ductal dilatation. ADRENALS: Unremarkable. KIDNEYS: No mass, calculus, or hydronephrosis. STOMACH: Status post gastric bypass surgery, with contrast filling the gastric  pouch and proximal small bowel and no evidence for contrast in the excluded  gastric remnant.  No extravasation of contrast. No fluid collection or abscess  suggested. SMALL BOWEL: No dilatation or wall thickening. COLON: No dilatation or wall thickening. APPENDIX: Not seen. PERITONEUM: No ascites or pneumoperitoneum. RETROPERITONEUM: No lymphadenopathy or aortic aneurysm. REPRODUCTIVE ORGANS: Not seen. URINARY BLADDER: No mass or calculus. BONES: No destructive bone lesion. ADDITIONAL COMMENTS: N/A     IMPRESSION:  1. Status post gastric bypass surgery with no leak, abscess, obstruction,  gastric gastric fistula or other complicating feature demonstrated. 2. Tiny pulmonary nodules in the lower lobes of uncertain if any significance. Guidelines by the Fleischner society (Radiology 2005: 607:511-501) suggest that  patients with a low risk for lung cancer who have nodules less than or equal to  4 mm in diameter require no follow-up. In patients with a  higher risk, such as  smokers, follow-up is recommended in one year. Patients with a known malignancy  are at increased risk for metastasis and should receive three month follow-up.         IMPRESSION/PLAN:  Sunny Tanner is a 34 y.o. female with a working diagnosis of bilateral inguinal lymphadenopathy, along with a family history of breast cancer and BRCA 2 mutation. I reviewed with Sunny Tanner her medical records, physical exam, and review of symptoms. There are two issues that we are dealing with, but with possible overlap. As for her family history, Ro needs genetic testing. She meets all of the requirements for testing considering her history. I counseled her on the importance of testing, the benefits of testing, and the potential risks of testing. If she is found to be positive, we can then address risk-reducing surgery and/or enhanced screening for both breast and ovarian cancer. If negative, she can be somewhat reassured that she is not at an increased risk for either breast or ovarian cancer above that of the general population.   Testing was performed and submitted during her visit today. As for the enlarged and tender nodes, the only way to know for sure as to the etiology is to perform a biopsy. We could perform a needle biopsy percutaneously or we could proceed with an excisional biopsy. I recommended the later. This would give us more tissue for pathology and a better chance of definitive diagnosis. The differential diagnosis includes both benign and malignant etiologies. I suspect it is from a benign issue, but I have seen a BRCA mutated patient with ovarian cancer present with groin adenopathy before. She was counseled on the risks, benefits, indications, and alternatives of surgery. Her questions were answered and she wishes to proceed. Signed By: Og Lopez MD     3/15/2019/1:08 PM     Date of Surgery Update:  Ayan Rowe was seen and examined. History and physical has been reviewed. The patient has been examined.  There have been no significant clinical changes since the completion of the originally dated History and Physical.    Signed By: Og Lopez MD     March 15, 2019 10:43 AM

## 2019-03-15 NOTE — DISCHARGE INSTRUCTIONS
-Please remove scope patch behind right ear on Monday, 3/18/2019 around 11:00 am.  Please remove with tissue and wash hands after discarding.    - May shower in 24 hours. - Soap, water, pat dry incision site. Do not pick at site. - No driving while on narcotics  - Follow up with Dr. Mayur Allen in two weeks. -YOU HAD ONE PAIN PILL (PERCOCET) AT 1:15PM  ______________________________________________________________________    Anesthesia Discharge Instructions    After general anesthesia or intervenous sedation, for 24 hours or while taking prescription Narcotics:  · Limit your activities  · Do not drive or operate hazardous machinery  · If you have not urinated within 8 hours after discharge, please contact your surgeon on call. · Do not make important personal or business decisions  · Do not drink alcoholic beverages    Report the following to your surgeon:  · Excessive pain, swelling, redness or odor of or around the surgical area  · Temperature over 100.5 degrees  · Nausea and vomiting lasting longer than 4 hours or if unable to take medication  · Any signs of decreased circulation or nerve impairment to extremity:  Change in color, persistent numbness, tingling, coldness or increased pain.   · Any questions

## 2019-03-15 NOTE — BRIEF OP NOTE
BRIEF OPERATIVE NOTE    Date of Procedure: 3/15/2019   Preoperative Diagnosis: INGUINAL LYMPHADENOPATHY  Postoperative Diagnosis: INGUINAL LYMPHADENOPATHY    Procedure(s): EXCISIONAL BIOPSY OF RIGHT GROIN NODE  Surgeon(s) and Role:     Joey Armenta MD - Primary         Surgical Assistant: None    Surgical Staff:  Circ-1: Niels Huang RN  Circ-Relief: Luci Malhotra RN-1: Elena Haynes  Float Staff: Chavo Yang RN  Event Time In Time Out   Incision Start 1134    Incision Close 1151      Anesthesia: General   Estimated Blood Loss: 5 cc  Specimens:   ID Type Source Tests Collected by Time Destination   1 : right inguinal lymph node Fresh Lymph Node  Cody Swenson MD 3/15/2019 1145 Pathology      Findings: Two darkly pigmented lymph nodes identified and removed. Nodes located just below Lissa's fascia in the thigh crease.    Complications: None  Implants: * No implants in log *    Og Lopez MD

## 2019-03-15 NOTE — OP NOTES
Gynecologic Oncology Operative Report    Jonathon Rockfall    Pre-operative dx: Inguinal lymphadenopathy    Post-operative dx: Inguinal lymphadenopathy    Procedure:  Excisional biopsy of right groin node    Surgeon:  Chavez Christianson MD    Assistant: None     Anesthesia:  GETA    EBL:  5 cc    Complications:  None    Implants:  None    Specimens:  ID Type Source Tests Collected by Time Destination   1 : right inguinal lymph node Fresh Lymph Node   Rosa Villafuerte MD 3/15/2019 1145 Pathology     Operative indications: 35 yo WF with tender, palpable lymph nodes in bilateral groins. She feels like they have increased in size. She has a family history of ovarian cancer. Her mom is BRCA positive. Sharri's testing is pending at this time. I recommended an excisional biopsy to rule out malignancy. Operative findings: Two darkly pigmented lymph nodes identified and removed. Nodes located just below Lissa's fascia in the thigh crease. Procedure in detail:  After the risks, benefits, indications, and alternatives of the procedure were discussed with the patient and informed consent was obtained, the patient was taken to the operating room. She was positively identified, administered general anesthesia, and then placed in the supine position. She was then prepped and draped in the usual fashion. The right groin was palpated and a few firm, superficial nodes were identified along the inguinal ligament. A marking pen was used to moses their location. A small incision was then made with a #10 blade scalpel over them, paralleling the thigh crease. Cautery was then used to carry the dissection deeper. Lissa's fascia was then divided with cautery. The lymph node was identified. A combination of sharp and blunt dissection was used to mobilize the node, taking care to preserve the vasculature. There was a second node identified below the initial node. They appeared to be connected.    This node was also excised in continuity. The specimen was sent for permanent pathology. The area was then thoroughly irrigated and made hemostatic with cautery. Lissa's fascia was then approximated with a running 2-0 Vicryl suture. The skin was then closed with a running 3-0 Monocryl suture. Dermabond was then applied along the incision. The patient was awakened from anesthesia and taken to the recovery room in stable condition. All instrument, sponge, and needle counts were correct.         Ariel Delcid MD  3/15/2019  11:55 AM

## 2019-03-15 NOTE — PERIOP NOTES
Patient: Jonathon Perez MRN: [id number]  SSN: [id number]   YOB: 1985  Age: 34 y.o. Sex: female     Patient is status post Procedure(s):  EXCISIONAL BIOPSY OF RIGHT GROIN NODE.     Surgeon(s) and Role:     Yas Elder MD - Primary                      Peripheral IV 03/15/19 Right Hand (Active)                           Dressing/Packing:  Wound Groin-Dressing Type : Topical skin adhesive/glue (03/15/19 1100)

## 2019-03-21 ENCOUNTER — TELEPHONE (OUTPATIENT)
Dept: GYNECOLOGY | Age: 34
End: 2019-03-21

## 2022-03-18 PROBLEM — D68.51 FACTOR V LEIDEN (HCC): Status: ACTIVE | Noted: 2019-03-08

## 2022-03-18 PROBLEM — J45.909 MODERATE ASTHMA: Status: ACTIVE | Noted: 2019-03-08

## 2022-03-19 PROBLEM — Z98.84 S/P GASTRIC BYPASS: Status: ACTIVE | Noted: 2019-03-04

## 2022-03-19 PROBLEM — Z90.49 S/P CHOLECYSTECTOMY: Status: ACTIVE | Noted: 2019-03-08

## 2022-03-19 PROBLEM — R59.0 INGUINAL LYMPHADENOPATHY: Status: ACTIVE | Noted: 2019-03-08

## 2022-03-19 PROBLEM — Z84.81 FHX: BRCA2 GENE POSITIVE: Status: ACTIVE | Noted: 2019-03-08

## 2022-03-19 PROBLEM — Z90.710 S/P TAH (TOTAL ABDOMINAL HYSTERECTOMY): Status: ACTIVE | Noted: 2019-03-08

## 2022-03-20 PROBLEM — F41.9 ANXIETY AND DEPRESSION: Status: ACTIVE | Noted: 2019-03-08

## 2022-03-20 PROBLEM — Z98.890 H/O HERNIA REPAIR: Status: ACTIVE | Noted: 2019-03-08

## 2022-03-20 PROBLEM — F32.A ANXIETY AND DEPRESSION: Status: ACTIVE | Noted: 2019-03-08

## 2022-03-20 PROBLEM — Z87.19 H/O HERNIA REPAIR: Status: ACTIVE | Noted: 2019-03-08

## 2023-05-19 RX ORDER — SUCRALFATE ORAL 1 G/10ML
1 SUSPENSION ORAL 4 TIMES DAILY
COMMUNITY
Start: 2019-03-07

## 2023-05-19 RX ORDER — PANTOPRAZOLE SODIUM 40 MG/1
40 TABLET, DELAYED RELEASE ORAL 2 TIMES DAILY
COMMUNITY
Start: 2019-03-07

## 2023-05-19 RX ORDER — SERTRALINE HYDROCHLORIDE 100 MG/1
TABLET, FILM COATED ORAL DAILY
COMMUNITY

## 2023-05-19 RX ORDER — ARIPIPRAZOLE 10 MG/1
10 TABLET ORAL DAILY
COMMUNITY

## 2023-05-19 RX ORDER — ALBUTEROL SULFATE 90 UG/1
2 AEROSOL, METERED RESPIRATORY (INHALATION) EVERY 4 HOURS PRN
COMMUNITY

## 2023-05-19 RX ORDER — ONDANSETRON 4 MG/1
4 TABLET, ORALLY DISINTEGRATING ORAL EVERY 8 HOURS PRN
COMMUNITY
Start: 2019-03-07

## 2023-05-19 RX ORDER — TRAZODONE HYDROCHLORIDE 100 MG/1
100 TABLET ORAL NIGHTLY
COMMUNITY

## 2023-05-19 RX ORDER — FLUTICASONE PROPIONATE AND SALMETEROL 250; 50 UG/1; UG/1
1 POWDER RESPIRATORY (INHALATION) EVERY 12 HOURS
COMMUNITY

## 2023-05-19 RX ORDER — BUSPIRONE HYDROCHLORIDE 10 MG/1
10 TABLET ORAL 3 TIMES DAILY
COMMUNITY

## 2023-05-19 RX ORDER — HYDROXYZINE PAMOATE 25 MG/1
25 CAPSULE ORAL 3 TIMES DAILY PRN
COMMUNITY

## (undated) DEVICE — ROCKER SWITCH PENCIL BLADE ELECTRODE, HOLSTER: Brand: EDGE

## (undated) DEVICE — SUTURE MCRYL SZ 3-0 L27IN ABSRB UD L24MM PS-1 3/8 CIR PRIM Y936H

## (undated) DEVICE — GLOVE SURG SZ 75 L1212IN FNGR THK138MIL BRN LTX FREE

## (undated) DEVICE — GOWN,SIRUS,NONRNF,SETINSLV,XL,20/CS: Brand: MEDLINE

## (undated) DEVICE — DERMABOND SKIN ADH 0.7ML -- DERMABOND ADVANCED 12/BX

## (undated) DEVICE — REM POLYHESIVE ADULT PATIENT RETURN ELECTRODE: Brand: VALLEYLAB

## (undated) DEVICE — LIGHT HANDLE: Brand: DEVON

## (undated) DEVICE — SUTURE VCRL SZ 2-0 L27IN ABSRB UD L26MM SH 1/2 CIR J417H

## (undated) DEVICE — DEVON™ KNEE AND BODY STRAP 60" X 3" (1.5 M X 7.6 CM): Brand: DEVON

## (undated) DEVICE — 1200 GUARD II KIT W/5MM TUBE W/O VAC TUBE: Brand: GUARDIAN

## (undated) DEVICE — INFECTION CONTROL KIT SYS

## (undated) DEVICE — DBD-PACK,LAPAROTOMY,2 REINFORCED GOWNS: Brand: MEDLINE

## (undated) DEVICE — TOWEL SURG W17XL27IN STD BLU COT NONFENESTRATED PREWASHED

## (undated) DEVICE — KENDALL SCD EXPRESS SLEEVES, KNEE LENGTH, MEDIUM: Brand: KENDALL SCD

## (undated) DEVICE — SURGICAL PROCEDURE PACK BASIN MAJ SET CUST NO CAUT